# Patient Record
Sex: MALE | Race: WHITE | NOT HISPANIC OR LATINO
[De-identification: names, ages, dates, MRNs, and addresses within clinical notes are randomized per-mention and may not be internally consistent; named-entity substitution may affect disease eponyms.]

---

## 2018-09-20 ENCOUNTER — APPOINTMENT (OUTPATIENT)
Dept: PODIATRY | Facility: CLINIC | Age: 68
End: 2018-09-20
Payer: MEDICARE

## 2018-09-20 ENCOUNTER — OUTPATIENT (OUTPATIENT)
Dept: OUTPATIENT SERVICES | Facility: HOSPITAL | Age: 68
LOS: 1 days | Discharge: HOME | End: 2018-09-20

## 2018-09-20 DIAGNOSIS — M14.671 CHARCOT'S JOINT, RIGHT ANKLE AND FOOT: ICD-10-CM

## 2018-09-20 DIAGNOSIS — E11.40 TYPE 2 DIABETES MELLITUS WITH DIABETIC NEUROPATHY, UNSPECIFIED: ICD-10-CM

## 2018-09-20 PROCEDURE — 97760 ORTHOTIC MGMT&TRAING 1ST ENC: CPT

## 2018-10-18 ENCOUNTER — APPOINTMENT (OUTPATIENT)
Dept: PODIATRY | Facility: CLINIC | Age: 68
End: 2018-10-18

## 2020-03-26 ENCOUNTER — INPATIENT (INPATIENT)
Facility: HOSPITAL | Age: 70
LOS: 4 days | End: 2020-03-31
Attending: FAMILY MEDICINE | Admitting: FAMILY MEDICINE
Payer: MEDICARE

## 2020-03-26 VITALS
OXYGEN SATURATION: 100 % | SYSTOLIC BLOOD PRESSURE: 122 MMHG | DIASTOLIC BLOOD PRESSURE: 74 MMHG | HEIGHT: 73 IN | RESPIRATION RATE: 20 BRPM | WEIGHT: 315 LBS | TEMPERATURE: 96 F | HEART RATE: 109 BPM

## 2020-03-26 DIAGNOSIS — I48.91 UNSPECIFIED ATRIAL FIBRILLATION: ICD-10-CM

## 2020-03-26 DIAGNOSIS — E11.9 TYPE 2 DIABETES MELLITUS WITHOUT COMPLICATIONS: ICD-10-CM

## 2020-03-26 DIAGNOSIS — Z90.89 ACQUIRED ABSENCE OF OTHER ORGANS: Chronic | ICD-10-CM

## 2020-03-26 DIAGNOSIS — M14.679 CHARCOT'S JOINT, UNSPECIFIED ANKLE AND FOOT: Chronic | ICD-10-CM

## 2020-03-26 DIAGNOSIS — N17.9 ACUTE KIDNEY FAILURE, UNSPECIFIED: ICD-10-CM

## 2020-03-26 DIAGNOSIS — E78.00 PURE HYPERCHOLESTEROLEMIA, UNSPECIFIED: ICD-10-CM

## 2020-03-26 DIAGNOSIS — R06.02 SHORTNESS OF BREATH: ICD-10-CM

## 2020-03-26 LAB
ALBUMIN SERPL ELPH-MCNC: 3.1 G/DL — LOW (ref 3.5–5.2)
ALP SERPL-CCNC: 108 U/L — SIGNIFICANT CHANGE UP (ref 30–115)
ALT FLD-CCNC: 10 U/L — SIGNIFICANT CHANGE UP (ref 0–41)
ANION GAP SERPL CALC-SCNC: 17 MMOL/L — HIGH (ref 7–14)
AST SERPL-CCNC: 18 U/L — SIGNIFICANT CHANGE UP (ref 0–41)
BASE EXCESS BLDV CALC-SCNC: -6.3 MMOL/L — LOW (ref -2–2)
BILIRUB SERPL-MCNC: 0.4 MG/DL — SIGNIFICANT CHANGE UP (ref 0.2–1.2)
BUN SERPL-MCNC: 56 MG/DL — HIGH (ref 10–20)
CA-I SERPL-SCNC: 1.17 MMOL/L — SIGNIFICANT CHANGE UP (ref 1.12–1.3)
CALCIUM SERPL-MCNC: 8.2 MG/DL — LOW (ref 8.5–10.1)
CHLORIDE SERPL-SCNC: 99 MMOL/L — SIGNIFICANT CHANGE UP (ref 98–110)
CK MB CFR SERPL CALC: 6.1 NG/ML — SIGNIFICANT CHANGE UP (ref 0.6–6.3)
CK MB CFR SERPL CALC: 6.7 NG/ML — HIGH (ref 0.6–6.3)
CK SERPL-CCNC: 220 U/L — SIGNIFICANT CHANGE UP (ref 0–225)
CK SERPL-CCNC: 263 U/L — HIGH (ref 0–225)
CO2 SERPL-SCNC: 20 MMOL/L — SIGNIFICANT CHANGE UP (ref 17–32)
CREAT ?TM UR-MCNC: 83 MG/DL — SIGNIFICANT CHANGE UP
CREAT SERPL-MCNC: 3.5 MG/DL — HIGH (ref 0.7–1.5)
GAS PNL BLDV: 136 MMOL/L — SIGNIFICANT CHANGE UP (ref 136–145)
GAS PNL BLDV: SIGNIFICANT CHANGE UP
GLUCOSE BLDC GLUCOMTR-MCNC: 177 MG/DL — HIGH (ref 70–99)
GLUCOSE BLDC GLUCOMTR-MCNC: 178 MG/DL — HIGH (ref 70–99)
GLUCOSE BLDC GLUCOMTR-MCNC: 184 MG/DL — HIGH (ref 70–99)
GLUCOSE SERPL-MCNC: 202 MG/DL — HIGH (ref 70–99)
HCO3 BLDV-SCNC: 23 MMOL/L — SIGNIFICANT CHANGE UP (ref 22–29)
HCT VFR BLD CALC: 37.2 % — LOW (ref 42–52)
HCT VFR BLDA CALC: 43.9 % — SIGNIFICANT CHANGE UP (ref 34–44)
HCV AB S/CO SERPL IA: 0.04 COI — SIGNIFICANT CHANGE UP
HCV AB SERPL-IMP: SIGNIFICANT CHANGE UP
HGB BLD CALC-MCNC: 14.3 G/DL — SIGNIFICANT CHANGE UP (ref 14–18)
HGB BLD-MCNC: 12.1 G/DL — LOW (ref 14–18)
HOROWITZ INDEX BLDV+IHG-RTO: 21 — SIGNIFICANT CHANGE UP
LACTATE BLDV-MCNC: 2.3 MMOL/L — HIGH (ref 0.5–1.6)
MCHC RBC-ENTMCNC: 29.4 PG — SIGNIFICANT CHANGE UP (ref 27–31)
MCHC RBC-ENTMCNC: 32.5 G/DL — SIGNIFICANT CHANGE UP (ref 32–37)
MCV RBC AUTO: 90.3 FL — SIGNIFICANT CHANGE UP (ref 80–94)
NRBC # BLD: 0 /100 WBCS — SIGNIFICANT CHANGE UP (ref 0–0)
NT-PROBNP SERPL-SCNC: 2618 PG/ML — HIGH (ref 0–300)
OSMOLALITY UR: 401 MOS/KG — SIGNIFICANT CHANGE UP (ref 50–1400)
PCO2 BLDV: 58 MMHG — HIGH (ref 41–51)
PH BLDV: 7.2 — LOW (ref 7.26–7.43)
PLATELET # BLD AUTO: 386 K/UL — SIGNIFICANT CHANGE UP (ref 130–400)
PO2 BLDV: 26 MMHG — SIGNIFICANT CHANGE UP (ref 20–40)
POTASSIUM BLDV-SCNC: 4.2 MMOL/L — SIGNIFICANT CHANGE UP (ref 3.3–5.6)
POTASSIUM SERPL-MCNC: 4.8 MMOL/L — SIGNIFICANT CHANGE UP (ref 3.5–5)
POTASSIUM SERPL-SCNC: 4.8 MMOL/L — SIGNIFICANT CHANGE UP (ref 3.5–5)
PROT SERPL-MCNC: 6.6 G/DL — SIGNIFICANT CHANGE UP (ref 6–8)
RBC # BLD: 4.12 M/UL — LOW (ref 4.7–6.1)
RBC # FLD: 13.1 % — SIGNIFICANT CHANGE UP (ref 11.5–14.5)
SAO2 % BLDV: 38 % — SIGNIFICANT CHANGE UP
SODIUM SERPL-SCNC: 136 MMOL/L — SIGNIFICANT CHANGE UP (ref 135–146)
SODIUM UR-SCNC: 54 MMOL/L — SIGNIFICANT CHANGE UP
TROPONIN T SERPL-MCNC: 0.04 NG/ML — CRITICAL HIGH
TROPONIN T SERPL-MCNC: 0.04 NG/ML — CRITICAL HIGH
TROPONIN T SERPL-MCNC: 0.05 NG/ML — CRITICAL HIGH
WBC # BLD: 8.97 K/UL — SIGNIFICANT CHANGE UP (ref 4.8–10.8)
WBC # FLD AUTO: 8.97 K/UL — SIGNIFICANT CHANGE UP (ref 4.8–10.8)

## 2020-03-26 PROCEDURE — 99223 1ST HOSP IP/OBS HIGH 75: CPT | Mod: AI

## 2020-03-26 PROCEDURE — 71045 X-RAY EXAM CHEST 1 VIEW: CPT | Mod: 26

## 2020-03-26 PROCEDURE — 76770 US EXAM ABDO BACK WALL COMP: CPT | Mod: 26

## 2020-03-26 PROCEDURE — 99285 EMERGENCY DEPT VISIT HI MDM: CPT | Mod: GC

## 2020-03-26 RX ORDER — HEPARIN SODIUM 5000 [USP'U]/ML
5000 INJECTION INTRAVENOUS; SUBCUTANEOUS EVERY 12 HOURS
Refills: 0 | Status: DISCONTINUED | OUTPATIENT
Start: 2020-03-26 | End: 2020-03-28

## 2020-03-26 RX ORDER — FUROSEMIDE 40 MG
60 TABLET ORAL ONCE
Refills: 0 | Status: DISCONTINUED | OUTPATIENT
Start: 2020-03-26 | End: 2020-03-26

## 2020-03-26 RX ORDER — SIMVASTATIN 20 MG/1
1 TABLET, FILM COATED ORAL
Qty: 0 | Refills: 0 | DISCHARGE

## 2020-03-26 RX ORDER — FUROSEMIDE 40 MG
40 TABLET ORAL ONCE
Refills: 0 | Status: COMPLETED | OUTPATIENT
Start: 2020-03-26 | End: 2020-03-26

## 2020-03-26 RX ORDER — FUROSEMIDE 40 MG
40 TABLET ORAL
Refills: 0 | Status: DISCONTINUED | OUTPATIENT
Start: 2020-03-26 | End: 2020-03-28

## 2020-03-26 RX ORDER — METOPROLOL TARTRATE 50 MG
25 TABLET ORAL ONCE
Refills: 0 | Status: COMPLETED | OUTPATIENT
Start: 2020-03-26 | End: 2020-03-26

## 2020-03-26 RX ORDER — DEXTROSE 50 % IN WATER 50 %
12.5 SYRINGE (ML) INTRAVENOUS ONCE
Refills: 0 | Status: DISCONTINUED | OUTPATIENT
Start: 2020-03-26 | End: 2020-03-31

## 2020-03-26 RX ORDER — SODIUM CHLORIDE 9 MG/ML
1000 INJECTION, SOLUTION INTRAVENOUS
Refills: 0 | Status: DISCONTINUED | OUTPATIENT
Start: 2020-03-26 | End: 2020-03-31

## 2020-03-26 RX ORDER — ASPIRIN/CALCIUM CARB/MAGNESIUM 324 MG
1 TABLET ORAL
Qty: 0 | Refills: 0 | DISCHARGE

## 2020-03-26 RX ORDER — FUROSEMIDE 40 MG
1 TABLET ORAL
Qty: 0 | Refills: 0 | DISCHARGE

## 2020-03-26 RX ORDER — CHLORHEXIDINE GLUCONATE 213 G/1000ML
1 SOLUTION TOPICAL
Refills: 0 | Status: DISCONTINUED | OUTPATIENT
Start: 2020-03-26 | End: 2020-03-31

## 2020-03-26 RX ORDER — DEXTROSE 50 % IN WATER 50 %
25 SYRINGE (ML) INTRAVENOUS ONCE
Refills: 0 | Status: DISCONTINUED | OUTPATIENT
Start: 2020-03-26 | End: 2020-03-31

## 2020-03-26 RX ORDER — DEXTROSE 50 % IN WATER 50 %
15 SYRINGE (ML) INTRAVENOUS ONCE
Refills: 0 | Status: DISCONTINUED | OUTPATIENT
Start: 2020-03-26 | End: 2020-03-31

## 2020-03-26 RX ORDER — GLUCAGON INJECTION, SOLUTION 0.5 MG/.1ML
1 INJECTION, SOLUTION SUBCUTANEOUS ONCE
Refills: 0 | Status: DISCONTINUED | OUTPATIENT
Start: 2020-03-26 | End: 2020-03-31

## 2020-03-26 RX ORDER — SPIRONOLACTONE 25 MG/1
0 TABLET, FILM COATED ORAL
Qty: 0 | Refills: 0 | DISCHARGE

## 2020-03-26 RX ORDER — INSULIN GLARGINE 100 [IU]/ML
36 INJECTION, SOLUTION SUBCUTANEOUS AT BEDTIME
Refills: 0 | Status: DISCONTINUED | OUTPATIENT
Start: 2020-03-26 | End: 2020-03-27

## 2020-03-26 RX ORDER — SIMVASTATIN 20 MG/1
20 TABLET, FILM COATED ORAL AT BEDTIME
Refills: 0 | Status: DISCONTINUED | OUTPATIENT
Start: 2020-03-26 | End: 2020-03-31

## 2020-03-26 RX ORDER — INSULIN GLARGINE 100 [IU]/ML
36 INJECTION, SOLUTION SUBCUTANEOUS
Qty: 0 | Refills: 0 | DISCHARGE

## 2020-03-26 RX ORDER — CANAGLIFLOZIN 100 MG/1
1 TABLET, FILM COATED ORAL
Qty: 0 | Refills: 0 | DISCHARGE

## 2020-03-26 RX ORDER — INSULIN LISPRO 100/ML
VIAL (ML) SUBCUTANEOUS
Refills: 0 | Status: DISCONTINUED | OUTPATIENT
Start: 2020-03-26 | End: 2020-03-31

## 2020-03-26 RX ORDER — METOPROLOL TARTRATE 50 MG
25 TABLET ORAL DAILY
Refills: 0 | Status: DISCONTINUED | OUTPATIENT
Start: 2020-03-26 | End: 2020-03-27

## 2020-03-26 RX ADMIN — Medication 40 MILLIGRAM(S): at 09:32

## 2020-03-26 RX ADMIN — Medication 40 MILLIGRAM(S): at 18:43

## 2020-03-26 RX ADMIN — SIMVASTATIN 20 MILLIGRAM(S): 20 TABLET, FILM COATED ORAL at 21:55

## 2020-03-26 RX ADMIN — HEPARIN SODIUM 5000 UNIT(S): 5000 INJECTION INTRAVENOUS; SUBCUTANEOUS at 18:42

## 2020-03-26 RX ADMIN — Medication 1: at 16:48

## 2020-03-26 RX ADMIN — Medication 1: at 12:31

## 2020-03-26 NOTE — ED PROVIDER NOTE - ATTENDING CONTRIBUTION TO CARE
69y male h/o HTN, DM with charcot joint c/o progressively worsening SOB with orthopnea and worsening leg/hand swelling with decreased u/o despite beginning diuretic, no cough, no CP, no f/c, no v/d, no palpitations, largely housebound with debilitated wife, home visit NP mentioned afib but pt does not carry that diagnosis, on exam vital signs appreciated, obese, dyspneic on minimal exertion but comfortable at rest on O2, conj pink op clear neck supple cor irreg lungs with diminished bs at bases L>R, no wheeze, no rhonchi, abd obese, snt, 3= b/l LE edema with chronic venous stasis changes/lichenification with areas of weeping, +charcot feet with left plantar callus without evidence of infection, calves nontender neuro nonfocal, will check labs, ekg, imaging

## 2020-03-26 NOTE — H&P ADULT - HISTORY OF PRESENT ILLNESS
· HPI Objective Statement: 69y M w/ PMH of HTN, DM, and Charcot join presents with progressive SOB for 1 week. Went to primary MD at symptom onset. Was prescribed Aldactone at first and was switched to lasix, both of which did not relieve his symptoms. + increasing peripheral edema and dry cough. Denies fever, chills, headache, lightheadedness, CP, abd pain, n/v/d, or numbness/tingling. · HPI Objective Statement: 69y M w/ PMH of DM Type 2, and Charcot joint: right foot: presents with progressive SOB for 1 week. Went to primary MD: Mike Villar at symptom onset. Was prescribed Aldactone 25mg at first which did not relieve dyspnea so lasix 40 mg was added. Both of which did not relieve his symptoms. Now presents to ER fo evaluation. -Denies chest pain -Reports increasing Pedal Edema with weeping legs. -Self ambulatory at home, uses a cane outside, ** Unable to ambulate presently.  -Has a home Nurse Practitioner who told patient he has Atrial Fibrillation: EKG was done about 6 days ago. Denied any occurrence of Atrial Fibrillation prior to this event. He was also informed he has Acute Renal Insufficiency. Denies prior Renal problems. -He was set up to then see Nephrologist Dr Grant this coming Monday.

## 2020-03-26 NOTE — H&P ADULT - NSICDXPASTMEDICALHX_GEN_ALL_CORE_FT
02/25/2017        [facility]  [address] [facility]      Dear Maria Dolores Duran records indicate that you have outstanding lab work and or testing that was ordered for you and has not yet been completed:      Vitamin D, 25-Hydroxy [E]  To provide y PAST MEDICAL HISTORY:  Diabetes Type 2    High cholesterol     Venous insufficiency

## 2020-03-26 NOTE — ED ADULT NURSE NOTE - NSFALLRSKASSESSDT_ED_ALL_ED
Pt denies sleep apnea.   \"I had a nuclear stress test at [facility] around 2012\" 26-Mar-2020 07:50

## 2020-03-26 NOTE — ED PROVIDER NOTE - CLINICAL SUMMARY MEDICAL DECISION MAKING FREE TEXT BOX
Patient with progressively worsening leg swelling now with dyspnea/orthopnea, PE as above, labs and studies reviewed, will admit monitored setting

## 2020-03-26 NOTE — H&P ADULT - NSHPSOCIALHISTORY_GEN_ALL_CORE
Tobacco use:   EtOH use:  Illicit drug use:  Marital Status: Tobacco use: Smoke 50 yrs: cigarettes, stopped 8 yrs ago, occasional Cigar  EtOH use: Rarely  Illicit drug use: No  Marital Status:

## 2020-03-26 NOTE — CONSULT NOTE ADULT - ASSESSMENT
Patient with volume overload secondary cata. He has afib possibly secondary to volume overload. He needs a echo. Repeat labs in am. Check u/a renal ultrasound . Continue ace when renal agrees. Metoprolol  er 12.5 q 24. When stable if candidate xarelto 15 qd with food. Possible out patient cardioversion in 6 weeks

## 2020-03-26 NOTE — ED PROVIDER NOTE - CARE PLAN
Principal Discharge DX:	Shortness of breath  Secondary Diagnosis:	Other hypervolemia  Secondary Diagnosis:	Acute renal failure, unspecified acute renal failure type  Secondary Diagnosis:	Atrial fibrillation, new onset

## 2020-03-26 NOTE — H&P ADULT - NSHPREVIEWOFSYSTEMS_GEN_ALL_CORE
REVIEW OF SYSTEMS:    CONSTITUTIONAL: No weakness, fevers or chills  EYES/ENT: No visual changes;  No vertigo or throat pain   NECK: No pain or stiffness  RESPIRATORY: No cough, wheezing, hemoptysis; POSITIVE shortness of breath  CARDIOVASCULAR: No chest pain or palpitations, ++ leg edema  GASTROINTESTINAL: No abdominal or epigastric pain. No nausea, vomiting, or hematemesis; No diarrhea or constipation. No melena or hematochezia.  GENITOURINARY: No dysuria, frequency or hematuria  NEUROLOGICAL: No numbness or weakness  SKIN: POSITIVE blistering

## 2020-03-26 NOTE — H&P ADULT - ASSESSMENT
70 y/o male admitted with Dyspnea, Atrial Fibrillation: onset about 6 days ago with ADAL: denies any prior Renal issues.

## 2020-03-26 NOTE — ED ADULT NURSE NOTE - NSIMPLEMENTINTERV_GEN_ALL_ED
Implemented All Fall with Harm Risk Interventions:  [place] to call system. Call bell, personal items and telephone within reach. Instruct patient to call for assistance. Room bathroom lighting operational. Non-slip footwear when patient is off stretcher. Physically safe environment: no spills, clutter or unnecessary equipment. Stretcher in lowest position, wheels locked, appropriate side rails in place. Provide visual cue, wrist band, yellow gown, etc. Monitor gait and stability. Monitor for mental status changes and reorient to person, place, and time. Review medications for side effects contributing to fall risk. Reinforce activity limits and safety measures with patient and family. Provide visual clues: red socks.

## 2020-03-26 NOTE — ED PROVIDER NOTE - PROGRESS NOTE DETAILS
labs and studies reviewed and d/w Dr Mcclendon, will admit LR tele, rayo placed with 300ml u/o, sono ordered, renal aware

## 2020-03-26 NOTE — CONSULT NOTE ADULT - SUBJECTIVE AND OBJECTIVE BOX
CARDIOLOGY CONSULT NOTE     CHIEF COMPLAINT/REASON FOR CONSULT:    HPI:  · HPI Objective Statement: 69y M w/ PMH of DM Type 2, and Charcot joint: right foot: presents with progressive SOB for 1 week. Went to primary MD: Mike Villar at symptom onset. Was prescribed Aldactone 25mg at first which did not relieve dyspnea so lasix 40 mg was added. Both of which did not relieve his symptoms. Now presents to ER fo evaluation. -Denies chest pain -Reports increasing Pedal Edema with weeping legs. -Self ambulatory at home, uses a cane outside, ** Unable to ambulate presently.  -Has a home Nurse Practitioner who told patient he has Atrial Fibrillation: EKG was done about 6 days ago. Denied any occurrence of Atrial Fibrillation prior to this event. He was also informed he has Acute Renal Insufficiency. Denies prior Renal problems. -He was set up to then see Nephrologist Dr Grant this coming Monday. (26 Mar 2020 09:50)      PAST MEDICAL & SURGICAL HISTORY:  Venous insufficiency  Diabetes: Type 2  High cholesterol  Charcot ankle: Right foot  S/P tonsillectomy      Cardiac Risks:   [ ]HTN, [x ] DM, [ ] Smoking, [ ] FH,  [x ] Lipids        MEDICATIONS:  MEDICATIONS  (STANDING):  chlorhexidine 4% Liquid 1 Application(s) Topical <User Schedule>  dextrose 5%. 1000 milliLiter(s) (50 mL/Hr) IV Continuous <Continuous>  dextrose 50% Injectable 12.5 Gram(s) IV Push once  dextrose 50% Injectable 25 Gram(s) IV Push once  dextrose 50% Injectable 25 Gram(s) IV Push once  furosemide   Injectable 40 milliGRAM(s) IV Push two times a day  heparin  Injectable 5000 Unit(s) SubCutaneous every 12 hours  insulin glargine SubCutaneous Injection (LANTUS) - Peds 36 Unit(s) SubCutaneous at bedtime  insulin lispro (HumaLOG) corrective regimen sliding scale   SubCutaneous three times a day before meals  simvastatin 20 milliGRAM(s) Oral at bedtime      FAMILY HISTORY:  Family history of breast cancer in mother      SOCIAL HISTORY:      [ ] Marital status   Allergies    No Known Allergies    Intolerances    	    REVIEW OF SYSTEMS:  CONSTITUTIONAL: No fever, weight loss, or fatigue  EYES: No eye pain, visual disturbances, or discharge  ENMT:  No difficulty hearing, tinnitus, vertigo; No sinus or throat pain  NECK: No pain or stiffness  RESPIRATORY: No cough, wheezing, chills or hemoptysis; No Shortness of Breath  CARDIOVASCULAR: See above  GASTROINTESTINAL: No abdominal or epigastric pain. No nausea, vomiting, or hematemesis; No diarrhea or constipation. No melena or hematochezia.  GENITOURINARY: No dysuria, frequency, hematuria, or incontinence  NEUROLOGICAL: No headaches, memory loss, loss of strength, numbness, or tremors  SKIN: No itching, burning, rashes, or lesions   	    PHYSICAL EXAM:  T(C): 34.4 (03-26-20 @ 11:19), Max: 35.4 (03-26-20 @ 07:41)  HR: 91 (03-26-20 @ 11:19) (91 - 109)  BP: 130/59 (03-26-20 @ 11:19) (117/54 - 130/59)  RR: 20 (03-26-20 @ 09:35) (20 - 20)  SpO2: 100% (03-26-20 @ 09:35) (100% - 100%)  Wt(kg): --  I&O's Summary      Appearance: Normal	  Psychiatry: A & O x 3, Mood & affect appropriate  HEENT:   Normal oral mucosa, PERRL, EOMI	  Lymphatic: No lymphadenopathy  Cardiovascular: Normal S1 S2 irreg, No JVD, No murmurs  Respiratory: Lungs clear to auscultation	  Gastrointestinal:  Soft, Non-tender, + BS	  Skin: No rashes, No ecchymoses, No cyanosis	  Neurologic: Non-focal  Extremities: Normal range of motion, No clubbing, cyanosis 1+ leg edema  Vascular: Peripheral pulses palpable 2+ bilaterally      ECG:  	not available    	  LABS:	 	    CARDIAC MARKERS:          Serum Pro-Brain Natriuretic Peptide: 2618 pg/mL (03-26 @ 07:50)                            12.1   8.97  )-----------( 386      ( 26 Mar 2020 07:50 )             37.2     03-26    136  |  99  |  56<H>  ----------------------------<  202<H>  4.8   |  20  |  3.5<H>    Ca    8.2<L>      26 Mar 2020 07:50    TPro  6.6  /  Alb  3.1<L>  /  TBili  0.4  /  DBili  x   /  AST  18  /  ALT  10  /  AlkPhos  108  03-26      proBNP: Serum Pro-Brain Natriuretic Peptide: 2618 pg/mL (03-26 @ 07:50)

## 2020-03-26 NOTE — H&P ADULT - ATTENDING COMMENTS
68 yo male with history of DM 2, HLD, charcot's foot, home bound admitted for     1. Acute respiratory distress, elevated troponin and new atrial fibrillation r/o CHF and AMI  - Telemetry monitoring  - Cardiology consulted, discussed case with him and he recommends echo and IV Lasix 40 BID. recommendations carried out  - Cycle troponin  - Daily weight, Pulse ox, Oxygen supplement  -Statin, ASA    2. ADAL   - Santillan placed  - renal US and urine lytes  - Nephrologist consulted  - Monitor urine output  - Monitor BUN/Creatinine    3. DM2  - lantus and SSI  - Diabetic protocol    4. HLD  - Statin    DVT prophylaxis    Code status: Discussed with patient, he has no living will and wishes to be full code.   Goals of care discussion once echo result available for CHF classification and after nephrologist recommendations are noted.    Patient wants his wife called only in emergency situation 68 yo male with history of DM 2, HLD, charcot's foot, home bound admitted for     1. Acute respiratory distress, elevated troponin and new atrial fibrillation r/o CHF and AMI  - Telemetry monitoring  - Cardiology consulted, discussed case with him and he recommends echo and IV Lasix 40 BID. recommendations carried out  - Cycle troponin  - Daily weight, Pulse ox, Oxygen supplement  -Statin, ASA    2. ADAL   - Santillan placed  - renal US and urine lytes  - Nephrologist consulted  - Monitor urine output  - Monitor BUN/Creatinine    3. DM2  - lantus and SSI  - Diabetic protocol    4. Hypercholesterolemia  - Statin    DVT prophylaxis    Code status: Discussed with patient, he has no living will and wishes to be full code.   Goals of care discussion once echo result available for CHF classification and after nephrologist recommendations are noted.    Patient wants his wife called only in emergency situation

## 2020-03-26 NOTE — CHART NOTE - NSCHARTNOTEFT_GEN_A_CORE
Pt w/ BP: 108/57 and HR 90s-110 earlier - instructed RN to hold metoprolol.       Will have RN recheck BP now as HR now jumping to 105-115 to revaluate for metoprolol.

## 2020-03-26 NOTE — ED ADULT NURSE NOTE - PMH
Diabetes    High cholesterol    HTN (hypertension)    Water on the lung Diabetes    High cholesterol    Water on the lung

## 2020-03-26 NOTE — ED PROVIDER NOTE - SECONDARY DIAGNOSIS.
Other hypervolemia Acute renal failure, unspecified acute renal failure type Atrial fibrillation, new onset

## 2020-03-26 NOTE — H&P ADULT - NSHPPHYSICALEXAM_GEN_ALL_CORE
Vital Signs Last 24 Hrs    T(F): 95.7 (03-26-20 @ 07:41), Max: 95.7 (03-26-20 @ 07:41)  HR: 100 (03-26-20 @ 09:35) (100 - 109)  BP: 117/54 (03-26-20 @ 09:35)  RR: 20 (03-26-20 @ 09:35) (20 - 20)  SpO2: 100% (03-26-20 @ 09:35) (100% - 100%)    PHYSICAL EXAM:      Constitutional: NAD, A&O x3    Eyes: PERRLA    Respiratory: +air entry, no rales, no rhonchi, no wheezes    Cardiovascular: +S1 and S2, regular rate and rhythm    Gastrointestinal: +BS, soft, non-tender, not distended    Extremities:  no edema, no calf tenderness    Vascular: +dorsal pedis and radial pulses, no extremity cyanosis    Neurological: sensation intact, ROM equal B/L, CN II-XII intact    Skin: no rashes, normal turgor Vital Signs Last 24 Hrs    T(F): 95.7 (03-26-20 @ 07:41), Max: 95.7 (03-26-20 @ 07:41)  HR: 100 (03-26-20 @ 09:35) (100 - 109)  BP: 117/54 (03-26-20 @ 09:35)  RR: 20 (03-26-20 @ 09:35) (20 - 20)  SpO2: 100% (03-26-20 @ 09:35) (100% - 100%)    PHYSICAL EXAM:      Constitutional: NAD, A&O x3    Eyes: PERRLA    Respiratory: +air entry, ** Decreased BS at bases bilaterally, no rales, no rhonchi, no wheezes    Cardiovascular: +S1 and S2, **irregular rate and rhythm, no audible murmur    Gastrointestinal: +BS, soft, non-tender, not distended    Extremities:  ** Marked Pedal edema, no calf tenderness    Vascular: **Brawny induration lower legs    Neurological: sensation intact, ROM equal B/L, CN II-XII intact    Skin: ++ No weeping noted, raw pink skin lower legs.

## 2020-03-26 NOTE — H&P ADULT - NSHPLABSRESULTS_GEN_ALL_CORE
12.1   8.97  )-----------( 386      ( 26 Mar 2020 07:50 )             37.2       03-26    136  |  99  |  56<H>  ----------------------------<  202<H>  4.8   |  20  |  3.5<H>    Ca    8.2<L>      26 Mar 2020 07:50    TPro  6.6  /  Alb  3.1<L>  /  TBili  0.4  /  DBili  x   /  AST  18  /  ALT  10  /  AlkPhos  108  03-26        CARDIAC MARKERS ( 26 Mar 2020 07:50 )  x     / 0.05 ng/mL        Xray Chest 1 View- PORTABLE-Urgent (03.26.20 @ 08:44) >      Impression:      Blunting of bilateral costophrenic angles. No focal consolidation.

## 2020-03-26 NOTE — ED PROVIDER NOTE - PHYSICAL EXAMINATION
CONSTITUTIONAL: Well-developed; well-nourished; in no acute distress.   SKIN: warm, dry  HEAD: Normocephalic; atraumatic.  EYES: PERRL, EOMI, no conjunctival erythema  ENT: No nasal discharge; airway clear.  NECK: Supple; non tender.  CARD: S1, S2 normal; no murmurs, gallops, or rubs. Tachycardic.  RESP: Increased work of breathing. Decreased breath sounds at B/L bases.  ABD: soft ntnd  EXT: Normal ROM.  No clubbing, cyanosis. B/L peripheral edema w/ chronic changes. + open sores at B/L anterior legs.    LYMPH: No acute cervical adenopathy.  NEURO: Alert, oriented, grossly unremarkable  PSYCH: Cooperative, appropriate.

## 2020-03-26 NOTE — ED PROVIDER NOTE - NS ED ROS FT
Eyes:  No visual changes, eye pain or discharge.  ENMT:  No hearing changes, pain, no sore throat or runny nose, no difficulty swallowing  Cardiac:  No chest pain, No chest pain with exertion. + SOB, edema  Respiratory:  No respiratory distress. No hemoptysis. No history of asthma or RAD. + cough  GI:  No nausea, vomiting, diarrhea or abdominal pain.  :  No dysuria, frequency or burning.  MS:  No myalgia, muscle weakness, joint pain or back pain.  Neuro:  No headache or weakness.  No LOC.  Skin:  No skin rash.   Endocrine: + history of diabetes.

## 2020-03-26 NOTE — ED PROVIDER NOTE - OBJECTIVE STATEMENT
69y M w/ PMH of HTN, DM, and Charcot join presents with progressive SOB for 1 week. Went to primary MD at symptom onset. Was prescribed -lactone at first and was switched to lasix, both of which did not relieve his symptoms. + increasing peripheral edema and dry cough. Denies fever, chills, headache, lightheadedness, CP, abd pain, n/v/d, or numbness/tingling.

## 2020-03-27 LAB
ANION GAP SERPL CALC-SCNC: 16 MMOL/L — HIGH (ref 7–14)
ANION GAP SERPL CALC-SCNC: 17 MMOL/L — HIGH (ref 7–14)
BUN SERPL-MCNC: 65 MG/DL — CRITICAL HIGH (ref 10–20)
BUN SERPL-MCNC: 67 MG/DL — CRITICAL HIGH (ref 10–20)
C DIFF BY PCR RESULT: NEGATIVE — SIGNIFICANT CHANGE UP
C DIFF TOX GENS STL QL NAA+PROBE: SIGNIFICANT CHANGE UP
CALCIUM SERPL-MCNC: 8 MG/DL — LOW (ref 8.5–10.1)
CALCIUM SERPL-MCNC: 8.1 MG/DL — LOW (ref 8.5–10.1)
CHLORIDE SERPL-SCNC: 103 MMOL/L — SIGNIFICANT CHANGE UP (ref 98–110)
CHLORIDE SERPL-SCNC: 103 MMOL/L — SIGNIFICANT CHANGE UP (ref 98–110)
CO2 SERPL-SCNC: 17 MMOL/L — SIGNIFICANT CHANGE UP (ref 17–32)
CO2 SERPL-SCNC: 20 MMOL/L — SIGNIFICANT CHANGE UP (ref 17–32)
CREAT SERPL-MCNC: 3.2 MG/DL — HIGH (ref 0.7–1.5)
CREAT SERPL-MCNC: 3.3 MG/DL — HIGH (ref 0.7–1.5)
ESTIMATED AVERAGE GLUCOSE: 186 MG/DL — HIGH (ref 68–114)
GLUCOSE BLDC GLUCOMTR-MCNC: 152 MG/DL — HIGH (ref 70–99)
GLUCOSE BLDC GLUCOMTR-MCNC: 168 MG/DL — HIGH (ref 70–99)
GLUCOSE BLDC GLUCOMTR-MCNC: 181 MG/DL — HIGH (ref 70–99)
GLUCOSE BLDC GLUCOMTR-MCNC: 181 MG/DL — HIGH (ref 70–99)
GLUCOSE SERPL-MCNC: 156 MG/DL — HIGH (ref 70–99)
GLUCOSE SERPL-MCNC: 161 MG/DL — HIGH (ref 70–99)
HBA1C BLD-MCNC: 8.1 % — HIGH (ref 4–5.6)
HCT VFR BLD CALC: 35 % — LOW (ref 42–52)
HGB BLD-MCNC: 11.1 G/DL — LOW (ref 14–18)
MCHC RBC-ENTMCNC: 28.9 PG — SIGNIFICANT CHANGE UP (ref 27–31)
MCHC RBC-ENTMCNC: 31.7 G/DL — LOW (ref 32–37)
MCV RBC AUTO: 91.1 FL — SIGNIFICANT CHANGE UP (ref 80–94)
NRBC # BLD: 0 /100 WBCS — SIGNIFICANT CHANGE UP (ref 0–0)
PLATELET # BLD AUTO: 319 K/UL — SIGNIFICANT CHANGE UP (ref 130–400)
POTASSIUM SERPL-MCNC: 4.6 MMOL/L — SIGNIFICANT CHANGE UP (ref 3.5–5)
POTASSIUM SERPL-MCNC: 4.6 MMOL/L — SIGNIFICANT CHANGE UP (ref 3.5–5)
POTASSIUM SERPL-SCNC: 4.6 MMOL/L — SIGNIFICANT CHANGE UP (ref 3.5–5)
POTASSIUM SERPL-SCNC: 4.6 MMOL/L — SIGNIFICANT CHANGE UP (ref 3.5–5)
RBC # BLD: 3.84 M/UL — LOW (ref 4.7–6.1)
RBC # FLD: 13.1 % — SIGNIFICANT CHANGE UP (ref 11.5–14.5)
SODIUM SERPL-SCNC: 137 MMOL/L — SIGNIFICANT CHANGE UP (ref 135–146)
SODIUM SERPL-SCNC: 139 MMOL/L — SIGNIFICANT CHANGE UP (ref 135–146)
TSH SERPL-MCNC: 0.84 UIU/ML — SIGNIFICANT CHANGE UP (ref 0.27–4.2)
WBC # BLD: 12.11 K/UL — HIGH (ref 4.8–10.8)
WBC # FLD AUTO: 12.11 K/UL — HIGH (ref 4.8–10.8)

## 2020-03-27 PROCEDURE — 99233 SBSQ HOSP IP/OBS HIGH 50: CPT

## 2020-03-27 RX ORDER — PANTOPRAZOLE SODIUM 20 MG/1
40 TABLET, DELAYED RELEASE ORAL
Refills: 0 | Status: DISCONTINUED | OUTPATIENT
Start: 2020-03-27 | End: 2020-03-31

## 2020-03-27 RX ORDER — METOPROLOL TARTRATE 50 MG
12.5 TABLET ORAL EVERY 6 HOURS
Refills: 0 | Status: DISCONTINUED | OUTPATIENT
Start: 2020-03-27 | End: 2020-03-28

## 2020-03-27 RX ADMIN — HEPARIN SODIUM 5000 UNIT(S): 5000 INJECTION INTRAVENOUS; SUBCUTANEOUS at 05:12

## 2020-03-27 RX ADMIN — Medication 12.5 MILLIGRAM(S): at 17:11

## 2020-03-27 RX ADMIN — CHLORHEXIDINE GLUCONATE 1 APPLICATION(S): 213 SOLUTION TOPICAL at 05:11

## 2020-03-27 RX ADMIN — PANTOPRAZOLE SODIUM 40 MILLIGRAM(S): 20 TABLET, DELAYED RELEASE ORAL at 13:07

## 2020-03-27 RX ADMIN — Medication 1: at 16:57

## 2020-03-27 RX ADMIN — HEPARIN SODIUM 5000 UNIT(S): 5000 INJECTION INTRAVENOUS; SUBCUTANEOUS at 17:11

## 2020-03-27 RX ADMIN — Medication 12.5 MILLIGRAM(S): at 15:30

## 2020-03-27 RX ADMIN — Medication 1: at 08:15

## 2020-03-27 RX ADMIN — Medication 40 MILLIGRAM(S): at 05:11

## 2020-03-27 RX ADMIN — Medication 25 MILLIGRAM(S): at 06:54

## 2020-03-27 RX ADMIN — Medication 12.5 MILLIGRAM(S): at 23:22

## 2020-03-27 RX ADMIN — Medication 40 MILLIGRAM(S): at 17:11

## 2020-03-27 RX ADMIN — Medication 1: at 11:48

## 2020-03-27 RX ADMIN — SIMVASTATIN 20 MILLIGRAM(S): 20 TABLET, FILM COATED ORAL at 22:45

## 2020-03-27 NOTE — CONSULT NOTE ADULT - SUBJECTIVE AND OBJECTIVE BOX
NEPHROLOGY CONSULTATION NOTE    69y M w/ PMH of DM Type 2, and Charcot joint: right foot: presents with progressive SOB for 1 week. Went to primary MD: Mike Villar at symptom onset. Was prescribed Aldactone 25mg at first which did not relieve dyspnea so lasix 40 mg was added. Both of which did not relieve his symptoms. Now presents to ER fo evaluation.    -Reports increasing Pedal Edema with weeping legs.    -Has a home Nurse Practitioner who told patient he has Atrial Fibrillation: EKG was done about 6 days ago. Denied any occurrence of Atrial Fibrillation prior to this event. He was also informed he has Acute Renal Insufficiency. Denies prior Renal problems.  -He was set up to then see Nephrologist Dr Grant this coming Monday.    PAST MEDICAL & SURGICAL HISTORY:  Venous insufficiency  Diabetes: Type 2  High cholesterol  Charcot ankle: Right foot  S/P tonsillectomy    Allergies:  No Known Allergies    Home Medications Reviewed  Hospital Medications:   MEDICATIONS  (STANDING):  chlorhexidine 4% Liquid 1 Application(s) Topical <User Schedule>  dextrose 5%. 1000 milliLiter(s) (50 mL/Hr) IV Continuous <Continuous>  dextrose 50% Injectable 12.5 Gram(s) IV Push once  dextrose 50% Injectable 25 Gram(s) IV Push once  dextrose 50% Injectable 25 Gram(s) IV Push once  furosemide   Injectable 40 milliGRAM(s) IV Push two times a day  heparin  Injectable 5000 Unit(s) SubCutaneous every 12 hours  insulin glargine SubCutaneous Injection (LANTUS) - Peds 36 Unit(s) SubCutaneous at bedtime  insulin lispro (HumaLOG) corrective regimen sliding scale   SubCutaneous three times a day before meals  metoprolol succinate ER 25 milliGRAM(s) Oral daily  pantoprazole    Tablet 40 milliGRAM(s) Oral before breakfast  simvastatin 20 milliGRAM(s) Oral at bedtime      SOCIAL HISTORY:  Denies ETOH,Smoking,   FAMILY HISTORY:  Family history of breast cancer in mother        REVIEW OF SYSTEMS:  CONSTITUTIONAL: No weakness, fevers or chills  RESPIRATORY: No cough, wheezing, hemoptysis; Mild shortness of breath  CARDIOVASCULAR: Has chest pressure.  GASTROINTESTINAL: No abdominal or epigastric pain. No nausea, vomiting  GENITOURINARY: No dysuria, frequency, foamy urine, urinary urgency, incontinence or hematuria  NEUROLOGICAL: No numbness or weakness  SKIN: No itching, burning, rashes, or lesions   VASCULAR: Has bilateral lower extremity edema.   All other review of systems is negative unless indicated above.    VITALS:  T(F): 97 (03-27-20 @ 05:00), Max: 97 (03-27-20 @ 05:00)  HR: 59 (03-27-20 @ 05:00)  BP: 119/56 (03-27-20 @ 05:00)  RR: 16 (03-27-20 @ 05:00)  SpO2: --    03-26 @ 07:01  -  03-27 @ 07:00  --------------------------------------------------------  IN: 0 mL / OUT: 1375 mL / NET: -1375 mL          03-26-20 @ 07:01  -  03-27-20 @ 07:00  --------------------------------------------------------  IN: 0 mL / OUT: 1175 mL / NET: -1175 mL      I&O's Detail    26 Mar 2020 07:01  -  27 Mar 2020 07:00  --------------------------------------------------------  IN:  Total IN: 0 mL    OUT:    Indwelling Catheter - Urethral: 1175 mL    Voided: 200 mL  Total OUT: 1375 mL    Total NET: -1375 mL        Creatine Kinase, Serum: 220 U/L (03-26-20 @ 21:33)  Creatine Kinase, Serum: 263 U/L (03-26-20 @ 15:21)      PHYSICAL EXAM:  Constitutional: NAD, obese  Respiratory: CTA  Cardiovascular: S1, S2, RRR  Gastrointestinal: BS+, soft, NT/ND  Extremities: 1+ peripheral edema  Neurological: A/O x 3  : No CVA tenderness. Has rayo.   Skin: No rashes  Vascular Access:    LABS:  03-26    136  |  99  |  56<H>  ----------------------------<  202<H>  4.8   |  20  |  3.5<H>    Ca    8.2<L>      26 Mar 2020 07:50    TPro  6.6  /  Alb  3.1<L>  /  TBili  0.4  /  DBili      /  AST  18  /  ALT  10  /  AlkPhos  108  03-26    Creatinine Trend: 3.5 <--                        12.1   8.97  )-----------( 386      ( 26 Mar 2020 07:50 )             37.2     Urine Studies:    Creatinine, Random Urine: 83 mg/dL (03-26 @ 14:18)  Sodium, Random Urine: 54.0 mmoL/L (03-26 @ 14:18)  Osmolality, Random Urine: 401 mos/kg (03-26 @ 14:18)            RADIOLOGY & ADDITIONAL STUDIES:      < from: US Retroperitoneal Complete (03.26.20 @ 12:02) >  uboptimal evaluation due to patient body habitus and clinical condition.    RIGHT KIDNEY: Normal in echogenicity, size measuring 13.0 x 6.0 x 7.0 cm in length. No evidence of hydronephrosis, calculus or solid mass. Vascular flow is demonstrated atthe hilum.    LEFT KIDNEY: Normal in echogenicity, size measuring 17.0 x 7.0 x 8.0 cm in length. No evidence of hydronephrosis, calculus or solid mass. Vascular flow is demonstrated at the hilum. There are multiple cysts noted within the kidney the largest measuring 13.0 x 12.0 x 12.0 cm.    URINARY BLADDER: Underdistended patient on folic catheter.    IMPRESSION:  Suboptimal evaluation of the kidneys due to patient body habitus and clinical condition.    1. No hydronephrosis or renal calculi in the visualized portions of either kidneys.    2. Left renal cysts.    < end of copied text >  < from: Xray Chest 1 View- PORTABLE-Urgent (03.26.20 @ 08:44) >  Blunting of bilateral costophrenic angles. No focal consolidation.    < end of copied text >

## 2020-03-27 NOTE — PHYSICAL THERAPY INITIAL EVALUATION ADULT - CRITERIA FOR SKILLED THERAPEUTIC INTERVENTIONS
rehab potential/predicted duration of therapy intervention/anticipated equipment needs at discharge/impairments found/functional limitations in following categories/therapy frequency

## 2020-03-27 NOTE — PROGRESS NOTE ADULT - ASSESSMENT
Patient is 70 yo male with history of DM 2, HLD, charcot's foot, home bound admitted for     1. Acute respiratory hypoxic respiratory failure  Possible acute CHF  IV lasix PRN  TTE pending  monitor I/O  keep negative output       2.  atrial fib  -Continue with BB for rate control  Anticoagulation when stable   -Cardiology to follow up     3. ADAL on CKD stage 4  -continue with rayo  -renal US shows no acute process  - Nephrologist consulted  - Monitor urine output  - Monitor BUN/Creatinine    4 DM2  - lantus and SSI  - Diabetic protocol    5. Hypercholesterolemia  - Statin    DVT prophylaxis heparin  Code full Code

## 2020-03-27 NOTE — CONSULT NOTE ADULT - ASSESSMENT
Pt with DM, obesity, CKD admitted with chest pressure and increased LE edema.  Admitted found to have Afib.    ADAL on CKD4 - unknown baseline creat  - nonoliguric, kidney sono no obstruction - preserved size kidneys  - likely due to DM and cardiorenal syndrome  - proteinuria ~2 gr/g creat  - obtain UA  - check phos, iPTH    AFib - Xarelto not recommended in advanced renal failure  LE edema - likely due jasvir CHF  - cardiac enzyems, r/o ACS  - check 2D Echo  cont Lasix 40 m IV q 12 hrs  stric t is and Os  Will follow    Will spencer

## 2020-03-27 NOTE — PROGRESS NOTE ADULT - SUBJECTIVE AND OBJECTIVE BOX
CC.  SOB/Atrial fib  HPI.  Patient reports SOB are improving.  afebrile.  Offers no other complaints                Constitutional: No fever, fatigue or weight loss.  Skin: No rash.  Eyes: No recent vision problems or eye pain.  ENT: No congestion, ear pain, or sore throat.  Endocrine: No thyroid problems.  Cardiovascular: No chest pain or palpation.  Respiratory: No cough, congestion, or wheezing.  Gastrointestinal: No abdominal pain, nausea, vomiting, or diarrhea.  Genitourinary: No dysuria.  Musculoskeletal: No joint swelling.  Neurologic: No headache.      Vital Signs Last 24 Hrs  T(C): 36.1 (03-27-20 @ 05:00), Max: 36.1 (03-27-20 @ 05:00)  T(F): 97 (03-27-20 @ 05:00), Max: 97 (03-27-20 @ 05:00)  HR: 59 (03-27-20 @ 05:00) (59 - 110)  BP: 119/56 (03-27-20 @ 05:00) (99/55 - 148/60)  BP(mean): --  RR: 16 (03-27-20 @ 05:00) (16 - 16)  SpO2: --        PHYSICAL EXAM-  GENERAL: NAD,    HEAD:  Atraumatic, Normocephalic  EYES: EOMI, PERRLA, conjunctiva and sclera clear  NECK: Supple, No JVD, Normal thyroid  NERVOUS SYSTEM:  Alert & Oriented X3, Moving all extremities  CHEST/LUNG: Min crackles with good air entry  HEART: Regular rate and rhythm; No murmurs, rubs, or gallops  ABDOMEN: Soft, Nontender, Nondistended; Bowel sounds present  EXTREMITIES:    No clubbing, cyanosis, or edema  SKIN: No rashes or lesions                                  12.1   8.97  )-----------( 386      ( 26 Mar 2020 07:50 )             37.2     03-26    136  |  99  |  56<H>  ----------------------------<  202<H>  4.8   |  20  |  3.5<H>    Ca    8.2<L>      26 Mar 2020 07:50    TPro  6.6  /  Alb  3.1<L>  /  TBili  0.4  /  DBili  x   /  AST  18  /  ALT  10  /  AlkPhos  108  03-26    CARDIAC MARKERS ( 26 Mar 2020 21:33 )  x     / 0.04 ng/mL / 220 U/L / x     / 6.1 ng/mL  CARDIAC MARKERS ( 26 Mar 2020 15:21 )  x     / 0.04 ng/mL / 263 U/L / x     / 6.7 ng/mL  CARDIAC MARKERS ( 26 Mar 2020 07:50 )  x     / 0.05 ng/mL / x     / x     / x                      MEDICATIONS  (STANDING):  chlorhexidine 4% Liquid 1 Application(s) Topical <User Schedule>  dextrose 5%. 1000 milliLiter(s) (50 mL/Hr) IV Continuous <Continuous>  dextrose 50% Injectable 12.5 Gram(s) IV Push once  dextrose 50% Injectable 25 Gram(s) IV Push once  dextrose 50% Injectable 25 Gram(s) IV Push once  furosemide   Injectable 40 milliGRAM(s) IV Push two times a day  heparin  Injectable 5000 Unit(s) SubCutaneous every 12 hours  insulin glargine SubCutaneous Injection (LANTUS) - Peds 36 Unit(s) SubCutaneous at bedtime  insulin lispro (HumaLOG) corrective regimen sliding scale   SubCutaneous three times a day before meals  metoprolol succinate ER 25 milliGRAM(s) Oral daily  pantoprazole    Tablet 40 milliGRAM(s) Oral before breakfast  simvastatin 20 milliGRAM(s) Oral at bedtime    MEDICATIONS  (PRN):  dextrose 40% Gel 15 Gram(s) Oral once PRN Blood Glucose LESS THAN 70 milliGRAM(s)/deciliter  glucagon  Injectable 1 milliGRAM(s) IntraMuscular once PRN Glucose LESS THAN 70 milligrams/deciliter          RADIOLOGY RESULTS:

## 2020-03-27 NOTE — PHYSICAL THERAPY INITIAL EVALUATION ADULT - GENERAL OBSERVATIONS, REHAB EVAL
Pt encountered semi-reclined in bed, NAD, agreeable to PT, ok to be seen by PT as confirmed by RN, on 4 L02/min via NC (sa02 99% checked by PT), +rayo +tele, PCA in room.

## 2020-03-28 LAB
ANION GAP SERPL CALC-SCNC: 16 MMOL/L — HIGH (ref 7–14)
BUN SERPL-MCNC: 71 MG/DL — CRITICAL HIGH (ref 10–20)
CALCIUM SERPL-MCNC: 8 MG/DL — LOW (ref 8.5–10.1)
CHLORIDE SERPL-SCNC: 102 MMOL/L — SIGNIFICANT CHANGE UP (ref 98–110)
CO2 SERPL-SCNC: 20 MMOL/L — SIGNIFICANT CHANGE UP (ref 17–32)
CREAT SERPL-MCNC: 3.8 MG/DL — HIGH (ref 0.7–1.5)
GLUCOSE BLDC GLUCOMTR-MCNC: 151 MG/DL — HIGH (ref 70–99)
GLUCOSE BLDC GLUCOMTR-MCNC: 154 MG/DL — HIGH (ref 70–99)
GLUCOSE BLDC GLUCOMTR-MCNC: 156 MG/DL — HIGH (ref 70–99)
GLUCOSE BLDC GLUCOMTR-MCNC: 170 MG/DL — HIGH (ref 70–99)
GLUCOSE SERPL-MCNC: 166 MG/DL — HIGH (ref 70–99)
HCT VFR BLD CALC: 34.2 % — LOW (ref 42–52)
HGB BLD-MCNC: 10.7 G/DL — LOW (ref 14–18)
MCHC RBC-ENTMCNC: 28.9 PG — SIGNIFICANT CHANGE UP (ref 27–31)
MCHC RBC-ENTMCNC: 31.3 G/DL — LOW (ref 32–37)
MCV RBC AUTO: 92.4 FL — SIGNIFICANT CHANGE UP (ref 80–94)
NRBC # BLD: 0 /100 WBCS — SIGNIFICANT CHANGE UP (ref 0–0)
PLATELET # BLD AUTO: 356 K/UL — SIGNIFICANT CHANGE UP (ref 130–400)
POTASSIUM SERPL-MCNC: 4.7 MMOL/L — SIGNIFICANT CHANGE UP (ref 3.5–5)
POTASSIUM SERPL-SCNC: 4.7 MMOL/L — SIGNIFICANT CHANGE UP (ref 3.5–5)
RBC # BLD: 3.7 M/UL — LOW (ref 4.7–6.1)
RBC # FLD: 13.2 % — SIGNIFICANT CHANGE UP (ref 11.5–14.5)
SODIUM SERPL-SCNC: 138 MMOL/L — SIGNIFICANT CHANGE UP (ref 135–146)
WBC # BLD: 12.39 K/UL — HIGH (ref 4.8–10.8)
WBC # FLD AUTO: 12.39 K/UL — HIGH (ref 4.8–10.8)

## 2020-03-28 PROCEDURE — 99233 SBSQ HOSP IP/OBS HIGH 50: CPT

## 2020-03-28 RX ORDER — METOPROLOL TARTRATE 50 MG
25 TABLET ORAL
Refills: 0 | Status: DISCONTINUED | OUTPATIENT
Start: 2020-03-28 | End: 2020-03-31

## 2020-03-28 RX ORDER — FUROSEMIDE 40 MG
40 TABLET ORAL DAILY
Refills: 0 | Status: DISCONTINUED | OUTPATIENT
Start: 2020-03-28 | End: 2020-03-29

## 2020-03-28 RX ORDER — RIVAROXABAN 15 MG-20MG
15 KIT ORAL DAILY
Refills: 0 | Status: DISCONTINUED | OUTPATIENT
Start: 2020-03-29 | End: 2020-03-30

## 2020-03-28 RX ADMIN — CHLORHEXIDINE GLUCONATE 1 APPLICATION(S): 213 SOLUTION TOPICAL at 05:35

## 2020-03-28 RX ADMIN — PANTOPRAZOLE SODIUM 40 MILLIGRAM(S): 20 TABLET, DELAYED RELEASE ORAL at 05:36

## 2020-03-28 RX ADMIN — HEPARIN SODIUM 5000 UNIT(S): 5000 INJECTION INTRAVENOUS; SUBCUTANEOUS at 05:38

## 2020-03-28 RX ADMIN — Medication 40 MILLIGRAM(S): at 05:36

## 2020-03-28 RX ADMIN — Medication 1: at 18:10

## 2020-03-28 RX ADMIN — Medication 1: at 08:07

## 2020-03-28 RX ADMIN — SIMVASTATIN 20 MILLIGRAM(S): 20 TABLET, FILM COATED ORAL at 21:43

## 2020-03-28 RX ADMIN — Medication 1: at 11:56

## 2020-03-28 RX ADMIN — Medication 12.5 MILLIGRAM(S): at 05:37

## 2020-03-28 NOTE — PROGRESS NOTE ADULT - SUBJECTIVE AND OBJECTIVE BOX
CC.  SOB/Atrial fib  HPI.  Patient reports SOB and LE edema are improving.  afebrile.  Offers no other complaints                Constitutional: No fever, fatigue or weight loss.  Skin: No rash.  Eyes: No recent vision problems or eye pain.  ENT: No congestion, ear pain, or sore throat.  Endocrine: No thyroid problems.  Cardiovascular: No chest pain or palpation.  Respiratory: No cough, congestion, or wheezing.  Gastrointestinal: No abdominal pain, nausea, vomiting, or diarrhea.  Genitourinary: No dysuria.  Musculoskeletal: No joint swelling.  Neurologic: No headache.      Vital Signs Last 24 Hrs  T(C): 35.9 (28 Mar 2020 10:45), Max: 36.2 (28 Mar 2020 08:53)  T(F): 96.7 (28 Mar 2020 10:45), Max: 97.1 (28 Mar 2020 08:53)  HR: 95 (28 Mar 2020 10:45) (90 - 96)  BP: 101/65 (28 Mar 2020 10:45) (101/65 - 140/62)  BP(mean): --  RR: 16 (28 Mar 2020 05:15) (16 - 18)  SpO2: 98% (28 Mar 2020 08:53) (98% - 98%)        PHYSICAL EXAM-  GENERAL: NAD,    HEAD:  Atraumatic, Normocephalic  EYES: EOMI, PERRLA, conjunctiva and sclera clear  NECK: Supple, No JVD, Normal thyroid  NERVOUS SYSTEM:  Alert & Oriented X3, Moving all extremities  CHEST/LUNG: Min crackles with good air entry  HEART: Regular rate and rhythm; No murmurs, rubs, or gallops  ABDOMEN: Soft, Nontender, Nondistended; Bowel sounds present  EXTREMITIES:    No clubbing, cyanosis, or edema  SKIN: No rashes or lesions                                   10.7   12.39 )-----------( 356      ( 28 Mar 2020 09:05 )             34.2     03-28    138  |  102  |  71<HH>  ----------------------------<  166<H>  4.7   |  20  |  3.8<H>    Ca    8.0<L>      28 Mar 2020 09:05      CARDIAC MARKERS ( 26 Mar 2020 21:33 )  x     / 0.04 ng/mL / 220 U/L / x     / 6.1 ng/mL  CARDIAC MARKERS ( 26 Mar 2020 15:21 )  x     / 0.04 ng/mL / 263 U/L / x     / 6.7 ng/mL                          MEDICATIONS  (STANDING):  chlorhexidine 4% Liquid 1 Application(s) Topical <User Schedule>  dextrose 5%. 1000 milliLiter(s) (50 mL/Hr) IV Continuous <Continuous>  dextrose 50% Injectable 12.5 Gram(s) IV Push once  dextrose 50% Injectable 25 Gram(s) IV Push once  dextrose 50% Injectable 25 Gram(s) IV Push once  furosemide    Tablet 40 milliGRAM(s) Oral daily  heparin  Injectable 5000 Unit(s) SubCutaneous every 12 hours  insulin lispro (HumaLOG) corrective regimen sliding scale   SubCutaneous three times a day before meals  metoprolol tartrate 25 milliGRAM(s) Oral two times a day  pantoprazole    Tablet 40 milliGRAM(s) Oral before breakfast  simvastatin 20 milliGRAM(s) Oral at bedtime    MEDICATIONS  (PRN):  dextrose 40% Gel 15 Gram(s) Oral once PRN Blood Glucose LESS THAN 70 milliGRAM(s)/deciliter  glucagon  Injectable 1 milliGRAM(s) IntraMuscular once PRN Glucose LESS THAN 70 milligrams/deciliter            RADIOLOGY RESULTS:

## 2020-03-28 NOTE — PROGRESS NOTE ADULT - ASSESSMENT
Patient is 68 yo male with history of DM 2, HLD, charcot's foot, home bound admitted for     1. Acute respiratory hypoxic respiratory failure  Possible due to acute CHF with peserved EF  Improving  Switch to po lasix  TTE shows NL EF  monitor I/O  keep negative output       2.  atrial fib  -Continue with BB for rate control  Anticoagulation when stable   -Cardiology to follow up     3. ADAL on CKD stage 4  -continue with rayo  -renal US shows no acute process  -Creatinine level elevated.  Possible due to diuresis  -Switch to po lasix  -Monitor renal function  - Nephrologist to follow up        4 DM2  - lantus and SSI  - Diabetic protocol    5. Hypercholesterolemia  - Statin    DVT prophylaxis heparin  Code full Code    #Progress Note Handoff  Pending (specify):  placement.  improvement of renal function  Family discussion:  plan of care was discussed with patient  in details.  all questions were answered.  seems to understand, and in agreement  Disposition:  unknown

## 2020-03-29 LAB
ANION GAP SERPL CALC-SCNC: 21 MMOL/L — HIGH (ref 7–14)
BUN SERPL-MCNC: 75 MG/DL — CRITICAL HIGH (ref 10–20)
CALCIUM SERPL-MCNC: 7.9 MG/DL — LOW (ref 8.5–10.1)
CHLORIDE SERPL-SCNC: 101 MMOL/L — SIGNIFICANT CHANGE UP (ref 98–110)
CO2 SERPL-SCNC: 15 MMOL/L — LOW (ref 17–32)
CREAT SERPL-MCNC: 4.3 MG/DL — CRITICAL HIGH (ref 0.7–1.5)
GLUCOSE BLDC GLUCOMTR-MCNC: 165 MG/DL — HIGH (ref 70–99)
GLUCOSE BLDC GLUCOMTR-MCNC: 165 MG/DL — HIGH (ref 70–99)
GLUCOSE BLDC GLUCOMTR-MCNC: 180 MG/DL — HIGH (ref 70–99)
GLUCOSE BLDC GLUCOMTR-MCNC: 181 MG/DL — HIGH (ref 70–99)
GLUCOSE SERPL-MCNC: 176 MG/DL — HIGH (ref 70–99)
HCT VFR BLD CALC: 37.3 % — LOW (ref 42–52)
HGB BLD-MCNC: 11.6 G/DL — LOW (ref 14–18)
MCHC RBC-ENTMCNC: 29 PG — SIGNIFICANT CHANGE UP (ref 27–31)
MCHC RBC-ENTMCNC: 31.1 G/DL — LOW (ref 32–37)
MCV RBC AUTO: 93.3 FL — SIGNIFICANT CHANGE UP (ref 80–94)
NRBC # BLD: 0 /100 WBCS — SIGNIFICANT CHANGE UP (ref 0–0)
PLATELET # BLD AUTO: 302 K/UL — SIGNIFICANT CHANGE UP (ref 130–400)
POTASSIUM SERPL-MCNC: 4.9 MMOL/L — SIGNIFICANT CHANGE UP (ref 3.5–5)
POTASSIUM SERPL-SCNC: 4.9 MMOL/L — SIGNIFICANT CHANGE UP (ref 3.5–5)
RBC # BLD: 4 M/UL — LOW (ref 4.7–6.1)
RBC # FLD: 13.2 % — SIGNIFICANT CHANGE UP (ref 11.5–14.5)
SODIUM SERPL-SCNC: 137 MMOL/L — SIGNIFICANT CHANGE UP (ref 135–146)
WBC # BLD: 9.98 K/UL — SIGNIFICANT CHANGE UP (ref 4.8–10.8)
WBC # FLD AUTO: 9.98 K/UL — SIGNIFICANT CHANGE UP (ref 4.8–10.8)

## 2020-03-29 PROCEDURE — 99233 SBSQ HOSP IP/OBS HIGH 50: CPT

## 2020-03-29 PROCEDURE — 71045 X-RAY EXAM CHEST 1 VIEW: CPT | Mod: 26

## 2020-03-29 RX ADMIN — RIVAROXABAN 15 MILLIGRAM(S): KIT at 10:54

## 2020-03-29 RX ADMIN — SIMVASTATIN 20 MILLIGRAM(S): 20 TABLET, FILM COATED ORAL at 21:54

## 2020-03-29 RX ADMIN — Medication 25 MILLIGRAM(S): at 05:48

## 2020-03-29 RX ADMIN — Medication 25 MILLIGRAM(S): at 17:29

## 2020-03-29 RX ADMIN — Medication 40 MILLIGRAM(S): at 05:45

## 2020-03-29 RX ADMIN — PANTOPRAZOLE SODIUM 40 MILLIGRAM(S): 20 TABLET, DELAYED RELEASE ORAL at 05:45

## 2020-03-29 RX ADMIN — Medication 1: at 16:58

## 2020-03-29 RX ADMIN — Medication 1: at 08:05

## 2020-03-29 RX ADMIN — CHLORHEXIDINE GLUCONATE 1 APPLICATION(S): 213 SOLUTION TOPICAL at 05:45

## 2020-03-29 NOTE — CHART NOTE - NSCHARTNOTEFT_GEN_A_CORE
I was called to talk to the patient   patient is complaining of SOB now.  afebrile.  No medical contraindication for discharge cough  reports pain with swallowing    offers no other complaints  Vital Signs Last 24 Hrs  T(C): 37.1 (29 Mar 2020 13:58), Max: 37.1 (29 Mar 2020 13:58)  T(F): 98.7 (29 Mar 2020 13:58), Max: 98.7 (29 Mar 2020 13:58)  HR: 90 (29 Mar 2020 13:58) (85 - 97)  BP: 108/55 (29 Mar 2020 13:58) (106/57 - 125/74)  BP(mean): --  RR: 18 (29 Mar 2020 13:58) (16 - 18)  SpO2: 96% (29 Mar 2020 18:23) (96% - 98%)      PHYSICAL EXAM-  GENERAL: NAD,   HEAD:  Atraumatic, Normocephalic  EYES: EOMI, PERRLA, conjunctiva and sclera clear  NECK: Supple, No JVD, Normal thyroid  NERVOUS SYSTEM:  Alert & Oriented X3, Moving all extremities  CHEST/LUNG: MIn crackles with good air entry  HEART: Regular rate and rhythm; No murmurs, rubs, or gallops  ABDOMEN: Soft, Nontender, Nondistended; Bowel sounds present  EXTREMITIES:   No clubbing, cyanosis, or edema  SKIN: No rashes or lesions  A/P SOB.  SPO2 98% on 2l.  Stat CXR  Pain with swallowing, GI consult

## 2020-03-29 NOTE — PROGRESS NOTE ADULT - SUBJECTIVE AND OBJECTIVE BOX
CC.  SOB/Atrial fib  HPI.  Patient reports SOB and LE edema are resolving.  afebrile.  Offers no other complaints                Constitutional: No fever, fatigue or weight loss.  Skin: No rash.  Eyes: No recent vision problems or eye pain.  ENT: No congestion, ear pain, or sore throat.  Endocrine: No thyroid problems.  Cardiovascular: No chest pain or palpation.  Respiratory: No cough, congestion, or wheezing.  Gastrointestinal: No abdominal pain, nausea, vomiting, or diarrhea.  Genitourinary: No dysuria.  Musculoskeletal: No joint swelling.  Neurologic: No headache.      Vital Signs Last 24 Hrs  T(C): 35.6 (29 Mar 2020 05:16), Max: 35.9 (28 Mar 2020 10:45)  T(F): 96.1 (29 Mar 2020 05:16), Max: 96.7 (28 Mar 2020 10:45)  HR: 97 (29 Mar 2020 05:16) (85 - 97)  BP: 125/74 (29 Mar 2020 05:16) (101/65 - 125/74)  BP(mean): --  RR: 16 (29 Mar 2020 05:16) (16 - 16)  SpO2: 98% (29 Mar 2020 09:28) (98% - 98%)        PHYSICAL EXAM-  GENERAL: NAD,    HEAD:  Atraumatic, Normocephalic  EYES: EOMI, PERRLA, conjunctiva and sclera clear  NECK: Supple, No JVD, Normal thyroid  NERVOUS SYSTEM:  Alert & Oriented X3, Moving all extremities  CHEST/LUNG: Min crackles with good air entry  HEART: Regular rate and rhythm; No murmurs, rubs, or gallops  ABDOMEN: Soft, Nontender, Nondistended; Bowel sounds present  EXTREMITIES:    No clubbing, cyanosis, or edema  SKIN: No rashes or lesions                                 10.7   12.39 )-----------( 356      ( 28 Mar 2020 09:05 )             34.2     03-28    138  |  102  |  71<HH>  ----------------------------<  166<H>  4.7   |  20  |  3.8<H>    Ca    8.0<L>      28 Mar 2020 09:05      MEDICATIONS  (STANDING):  chlorhexidine 4% Liquid 1 Application(s) Topical <User Schedule>  dextrose 5%. 1000 milliLiter(s) (50 mL/Hr) IV Continuous <Continuous>  dextrose 50% Injectable 12.5 Gram(s) IV Push once  dextrose 50% Injectable 25 Gram(s) IV Push once  dextrose 50% Injectable 25 Gram(s) IV Push once  furosemide    Tablet 40 milliGRAM(s) Oral daily  insulin lispro (HumaLOG) corrective regimen sliding scale   SubCutaneous three times a day before meals  metoprolol tartrate 25 milliGRAM(s) Oral two times a day  pantoprazole    Tablet 40 milliGRAM(s) Oral before breakfast  rivaroxaban 15 milliGRAM(s) Oral daily  simvastatin 20 milliGRAM(s) Oral at bedtime    MEDICATIONS  (PRN):  dextrose 40% Gel 15 Gram(s) Oral once PRN Blood Glucose LESS THAN 70 milliGRAM(s)/deciliter  glucagon  Injectable 1 milliGRAM(s) IntraMuscular once PRN Glucose LESS THAN 70 milligrams/deciliter              RADIOLOGY RESULTS:

## 2020-03-29 NOTE — PROGRESS NOTE ADULT - ASSESSMENT
Patient is 68 yo male with history of DM 2, HLD, charcot's foot, home bound admitted for     1. Acute respiratory hypoxic respiratory failure  Possible due to acute CHF with peserved EF  Improving  continue with po lasix  TTE shows NL EF  monitor I/O  keep negative output       2.  atrial fib  -Continue with BB for rate control  Anticoagulation when stable   -Cardiology to follow up     3. ADAL on CKD stage 4  -continue with rayo  -renal US shows no acute process  -Creatinine level elevated.  Possible due to diuresis  -PO lasix  -Monitor renal function  - Nephrologist to follow up        4 DM2  - lantus and SSI  - Diabetic protocol    5. Hypercholesterolemia  - Statin    DVT prophylaxis heparin  Code full Code    #Progress Note Handoff  Pending (specify):  placement.  improvement of renal function  Family discussion:  plan of care was discussed with patient  in details.  all questions were answered.  seems to understand, and in agreement  Disposition:  unknown Patient is 68 yo male with history of DM 2, HLD, charcot's foot, home bound admitted for     1. Acute respiratory hypoxic respiratory failure  Possible due to acute CHF with peserved EF  Improving  Hold off on po lasix  TTE shows NL EF  monitor I/O  keep negative output       2.  atrial fib  -Continue with BB for rate control  Anticoagulation when stable   -Cardiology to follow up     3. ADAL on CKD stage 4  -continue with rayo  -renal US shows no acute process  -Creatinine level elevated.  Possible due to diuresis  -hold PO lasix  -Monitor renal function  - Nephrologist to follow up        4 DM2  - lantus and SSI  - Diabetic protocol    5. Hypercholesterolemia  - Statin    DVT prophylaxis heparin  Code full Code    #Progress Note Handoff  Pending (specify):  placement.  improvement of renal function  Family discussion:  plan of care was discussed with patient  in details.  all questions were answered.  seems to understand, and in agreement  Disposition:  unknown

## 2020-03-30 LAB
ANION GAP SERPL CALC-SCNC: 21 MMOL/L — HIGH (ref 7–14)
BUN SERPL-MCNC: 85 MG/DL — CRITICAL HIGH (ref 10–20)
CALCIUM SERPL-MCNC: 7.8 MG/DL — LOW (ref 8.5–10.1)
CHLORIDE SERPL-SCNC: 99 MMOL/L — SIGNIFICANT CHANGE UP (ref 98–110)
CO2 SERPL-SCNC: 18 MMOL/L — SIGNIFICANT CHANGE UP (ref 17–32)
CREAT SERPL-MCNC: 5 MG/DL — CRITICAL HIGH (ref 0.7–1.5)
GLUCOSE BLDC GLUCOMTR-MCNC: 159 MG/DL — HIGH (ref 70–99)
GLUCOSE BLDC GLUCOMTR-MCNC: 170 MG/DL — HIGH (ref 70–99)
GLUCOSE BLDC GLUCOMTR-MCNC: 175 MG/DL — HIGH (ref 70–99)
GLUCOSE BLDC GLUCOMTR-MCNC: 190 MG/DL — HIGH (ref 70–99)
GLUCOSE SERPL-MCNC: 190 MG/DL — HIGH (ref 70–99)
HCT VFR BLD CALC: 35.1 % — LOW (ref 42–52)
HGB BLD-MCNC: 11 G/DL — LOW (ref 14–18)
MAGNESIUM SERPL-MCNC: 2.3 MG/DL — SIGNIFICANT CHANGE UP (ref 1.8–2.4)
MCHC RBC-ENTMCNC: 29 PG — SIGNIFICANT CHANGE UP (ref 27–31)
MCHC RBC-ENTMCNC: 31.3 G/DL — LOW (ref 32–37)
MCV RBC AUTO: 92.6 FL — SIGNIFICANT CHANGE UP (ref 80–94)
NRBC # BLD: 0 /100 WBCS — SIGNIFICANT CHANGE UP (ref 0–0)
PHOSPHATE SERPL-MCNC: 7.6 MG/DL — HIGH (ref 2.1–4.9)
PLATELET # BLD AUTO: 325 K/UL — SIGNIFICANT CHANGE UP (ref 130–400)
POTASSIUM SERPL-MCNC: 4.9 MMOL/L — SIGNIFICANT CHANGE UP (ref 3.5–5)
POTASSIUM SERPL-SCNC: 4.9 MMOL/L — SIGNIFICANT CHANGE UP (ref 3.5–5)
RBC # BLD: 3.79 M/UL — LOW (ref 4.7–6.1)
RBC # FLD: 13.4 % — SIGNIFICANT CHANGE UP (ref 11.5–14.5)
SODIUM SERPL-SCNC: 138 MMOL/L — SIGNIFICANT CHANGE UP (ref 135–146)
WBC # BLD: 9.23 K/UL — SIGNIFICANT CHANGE UP (ref 4.8–10.8)
WBC # FLD AUTO: 9.23 K/UL — SIGNIFICANT CHANGE UP (ref 4.8–10.8)

## 2020-03-30 PROCEDURE — 99233 SBSQ HOSP IP/OBS HIGH 50: CPT

## 2020-03-30 PROCEDURE — 99223 1ST HOSP IP/OBS HIGH 75: CPT

## 2020-03-30 RX ORDER — FUROSEMIDE 40 MG
40 TABLET ORAL
Refills: 0 | Status: DISCONTINUED | OUTPATIENT
Start: 2020-03-30 | End: 2020-03-31

## 2020-03-30 RX ORDER — CALCIUM ACETATE 667 MG
1334 TABLET ORAL
Refills: 0 | Status: DISCONTINUED | OUTPATIENT
Start: 2020-03-30 | End: 2020-03-31

## 2020-03-30 RX ADMIN — SIMVASTATIN 20 MILLIGRAM(S): 20 TABLET, FILM COATED ORAL at 21:53

## 2020-03-30 RX ADMIN — Medication 25 MILLIGRAM(S): at 05:59

## 2020-03-30 RX ADMIN — Medication 1334 MILLIGRAM(S): at 11:55

## 2020-03-30 RX ADMIN — Medication 25 MILLIGRAM(S): at 17:06

## 2020-03-30 RX ADMIN — Medication 1: at 11:55

## 2020-03-30 RX ADMIN — PANTOPRAZOLE SODIUM 40 MILLIGRAM(S): 20 TABLET, DELAYED RELEASE ORAL at 06:00

## 2020-03-30 RX ADMIN — Medication 1: at 08:23

## 2020-03-30 RX ADMIN — Medication 1: at 17:05

## 2020-03-30 RX ADMIN — Medication 40 MILLIGRAM(S): at 21:53

## 2020-03-30 RX ADMIN — Medication 1334 MILLIGRAM(S): at 17:06

## 2020-03-30 RX ADMIN — Medication 40 MILLIGRAM(S): at 10:44

## 2020-03-30 NOTE — CONSULT NOTE ADULT - ASSESSMENT
69yMale pmh DM, high cholesterol, Charcot ankle right foot: presents with progressive SOB for 1 week. Patient developed new onset odynophagia for 3 days.    Problem 1-New onset odynophagia for 3 days   Rec  -Optimize cardio-pulm status   -Patient very short of breath, bibasilar opacities/effusions, dry cough consider COVID-19 testing   -Follow up with patient's private GI Dr. Lopez for outpatient esophagram and EGD evaluation  -The benefits of endoscopy as well as the risks, such as GI bleeding, aspiration pneumonia, perforation, damage or loss of teeth, reaction to medication, or death  were reviewed with the patient. 69yMale pmh DM, high cholesterol, Charcot ankle right foot: presents with progressive SOB for 1 week. Patient developed new onset odynophagia for 3 days.    Problem 1-New onset odynophagia for 3 days   Rec  -Optimize cardio-pulm status   -Patient very short of breath, bibasilar opacities/effusions, dry cough consider COVID-19 testing   -Follow up with patient's private GI Dr. Lopez for outpatient esophagram and EGD evaluation  -The benefits of endoscopy as well as the risks, such as GI bleeding, aspiration pneumonia, perforation, damage or loss of teeth, reaction to medication, or death  were reviewed with the patient.       Recall GI if needed

## 2020-03-30 NOTE — PROGRESS NOTE ADULT - SUBJECTIVE AND OBJECTIVE BOX
CC.  SOB/Atrial fib  HPI.  Patient reports SOB and LE edema are resolving.  afebrile.  Offers no other complaints        Constitutional: No fever, fatigue or weight loss.  Skin: No rash.  Eyes: No recent vision problems or eye pain.  ENT: No congestion, ear pain, or sore throat.  Endocrine: No thyroid problems.  Cardiovascular: No chest pain or palpation.  Respiratory: No cough, congestion, or wheezing.  Gastrointestinal: No abdominal pain, nausea, vomiting, or diarrhea.  Genitourinary: No dysuria.  Musculoskeletal: No joint swelling.  Neurologic: No headache.      Vital Signs Last 24 Hrs  T(C): 35.6 (30 Mar 2020 05:29), Max: 37.1 (29 Mar 2020 13:58)  T(F): 96.1 (30 Mar 2020 05:29), Max: 98.7 (29 Mar 2020 13:58)  HR: 89 (30 Mar 2020 05:29) (89 - 91)  BP: 112/56 (30 Mar 2020 05:29) (108/55 - 112/56)  BP(mean): --  RR: 18 (30 Mar 2020 05:29) (18 - 18)  SpO2: 98% (30 Mar 2020 08:24) (96% - 98%)        PHYSICAL EXAM-  GENERAL: NAD,    HEAD:  Atraumatic, Normocephalic  EYES: EOMI, PERRLA, conjunctiva and sclera clear  NECK: Supple, No JVD, Normal thyroid  NERVOUS SYSTEM:  Alert & Oriented X3, Moving all extremities  CHEST/LUNG: Mild crackles with good air entry  HEART: Regular rate and rhythm; No murmurs, rubs, or gallops  ABDOMEN: Soft, Nontender, Nondistended; Bowel sounds present  EXTREMITIES:    No clubbing, cyanosis, or edema  SKIN: No rashes or lesions                          11.0   9.23  )-----------( 325      ( 30 Mar 2020 06:47 )             35.1     03-30    138  |  99  |  85<HH>  ----------------------------<  190<H>  4.9   |  18  |  5.0<HH>    Ca    7.8<L>      30 Mar 2020 06:47  Phos  7.6     03-30  Mg     2.3     03-30                  MEDICATIONS  (STANDING):  calcium acetate 1334 milliGRAM(s) Oral three times a day with meals  chlorhexidine 4% Liquid 1 Application(s) Topical <User Schedule>  dextrose 5%. 1000 milliLiter(s) (50 mL/Hr) IV Continuous <Continuous>  dextrose 50% Injectable 12.5 Gram(s) IV Push once  dextrose 50% Injectable 25 Gram(s) IV Push once  dextrose 50% Injectable 25 Gram(s) IV Push once  furosemide   Injectable 40 milliGRAM(s) IV Push two times a day  insulin lispro (HumaLOG) corrective regimen sliding scale   SubCutaneous three times a day before meals  metoprolol tartrate 25 milliGRAM(s) Oral two times a day  pantoprazole    Tablet 40 milliGRAM(s) Oral before breakfast  simvastatin 20 milliGRAM(s) Oral at bedtime    MEDICATIONS  (PRN):  dextrose 40% Gel 15 Gram(s) Oral once PRN Blood Glucose LESS THAN 70 milliGRAM(s)/deciliter  glucagon  Injectable 1 milliGRAM(s) IntraMuscular once PRN Glucose LESS THAN 70 milligrams/deciliter        Imaging Personally Reviewed:     [x ] YES  [ ] NO    Consultant(s) Notes Reviewed:  [x ] YES  [ ] NO    Care Discussed with Consultants/Other Providers [x ] YES  [ ] No medical contraindication for discharge

## 2020-03-30 NOTE — CHART NOTE - NSCHARTNOTEFT_GEN_A_CORE
Pt with acute  hypoxic respiratory failure. CXR showed increased bibasilar opacities/effusions.  Dr. Ruiz d/w ID who recommended r/o COVID-19.  Pt swabbed and specimen dropped at 1200.      Case d/w Dr. Ruiz

## 2020-03-30 NOTE — PROGRESS NOTE ADULT - ASSESSMENT
Patient is 70 yo male with history of DM 2, HLD, charcot's foot, home bound admitted for     1. Acute respiratory hypoxic respiratory failure  Possible due to acute CHF with peserved EF  Repeat CXR shows increased bibasilar opacities/effusions.  Nephrology recommended IV Lasix with metazolone   Patient is afebrile, WBC WNL.  Discussed case with ID who recommended to test for COVID19.  Precaution ordered   TTE shows NL EF  monitor I/O  keep negative output       2.  atrial fib  Continue with BB for rate control  anticoagulation when stable  Cardiology to follow up     3. ADAL on CKD stage 4  -continue with rayo  -renal US shows no acute process  -Creatinine level elevated today  -IV lasix  -Monitor I/O  -Keep negative output  -further workup pending  - Nephrologist to follow up        4 DM2  - lantus and SSI  - Diabetic protocol    5. Hypercholesterolemia  - Statin    DVT prophylaxis heparin  Code full Code    #Progress Note Handoff  Pending (specify):  placement.  improvement of renal function  Family discussion:  plan of care was discussed with patient  in details.  all questions were answered.  seems to understand, and in agreement.  Prognosis guarded  Disposition:  unknown Patient is 68 yo male with history of DM 2, HLD, charcot's foot, home bound admitted for     1. Acute  hypoxic respiratory failure  Possible due to acute CHF with peserved EF  Repeat CXR shows increased bibasilar opacities/effusions.  Nephrology recommended IV Lasix with metazolone   Patient is afebrile, WBC WNL.  Discussed case with ID who recommended to test for COVID19.  Precaution ordered   TTE shows NL EF  monitor I/O  keep negative output       2.  atrial fib  Continue with BB for rate control  anticoagulation when stable  Cardiology to follow up     3. ADAL on CKD stage 4  -continue with rayo  -renal US shows no acute process  -Creatinine level elevated today  -IV lasix  -Monitor I/O  -Keep negative output  -further workup pending  - Nephrologist to follow up        4 DM2  - lantus and SSI  - Diabetic protocol    5. Hypercholesterolemia  - Statin    DVT prophylaxis heparin  Code full Code    #Progress Note Handoff  Pending (specify):  placement.  improvement of renal function  Family discussion:  plan of care was discussed with patient  in details.  all questions were answered.  seems to understand, and in agreement.  Prognosis guarded  Disposition:  unknown

## 2020-03-30 NOTE — PROGRESS NOTE ADULT - ASSESSMENT
Pt with DM, obesity, CKD admitted with chest pressure and increased LE edema.  Admitted found to have Afib.    ADAL on CKD4 - creat 2.6 mg% from 2 weeks PTA (his PMD, MARISEL Orr)  - nonoliguric, kidney sono no obstruction - preserved size kidneys  - likely due to DM and cardiorenal syndrome  - proteinuria ~2 gr/g creat  - obtain UA  - worsening creat and oliguria 400 cc yesterday UO with worsening fluid overload  Rx: Restart Lasix 40 IV q 12 mg plus Metolazone 2.5 mg po once a day  -ABG to assess severity of hypoxemia  - will need RRT soon, although pt is refusing at this time  - STOP XArelto - will need a line soon, check PT/PTT  - w/u for ADAL - SPEP, UPEP, serum and urine immunofixation, POOJA c3c4 hEP b c PROFILE, ANCA  High phos - renal diet, will start Phoslo 2 tabs qac    D/W Hospitalist    Will follow

## 2020-03-30 NOTE — CONSULT NOTE ADULT - SUBJECTIVE AND OBJECTIVE BOX
Chief complaint/Reason for consult: Painful swallowing     HPI:  · HPI Objective Statement: 69y M w/ PMH of DM Type 2, and Charcot joint: right foot: presents with progressive SOB for 1 week. Went to primary MD: Mike Villar at symptom onset. Was prescribed Aldactone 25mg at first which did not relieve dyspnea so lasix 40 mg was added. Both of which did not relieve his symptoms. Now presents to ER fo evaluation. -Denies chest pain -Reports increasing Pedal Edema with weeping legs. -Self ambulatory at home, uses a cane outside, ** Unable to ambulate presently.  -Has a home Nurse Practitioner who told patient he has Atrial Fibrillation: EKG was done about 6 days ago. Denied any occurrence of Atrial Fibrillation prior to this event. He was also informed he has Acute Renal Insufficiency. Denies prior Renal problems. -He was set up to then see Nephrologist Dr Grant this coming Monday. (26 Mar 2020 09:50)    GI Updates: 69yMale pmh DM, high cholesterol, Charcot ankle right foot: presents with progressive SOB for 1 week. Patient denies nausea, vomiting, hematemesis, melena, blood in stool, diarrhea, constipation, abdominal pain. Patient developed new onset odynophagia for 3 days. Patient reports he had a colonoscopy a long time ago by Dr. Lopez reportedly normal.      PAST MEDICAL & SURGICAL HISTORY:   Venous insufficiency  Diabetes: Type 2  High cholesterol  Charcot ankle: Right foot  S/P tonsillectomy        Family history:  FAMILY HISTORY:  Family history of breast cancer in mother    No GI cancers in first or second degree relatives    Social History: No smoking. No alcohol. No illegal drug use.    Allergies:   No Known Allergies        MEDICATIONS: Home Medications:  Aspir 81 oral delayed release tablet: 1 tab(s) orally once a day (26 Mar 2020 10:26)  enalapril 10 mg oral tablet: 1 tab(s) orally once a day (26 Mar 2020 10:26)  furosemide 40 mg oral tablet: 1 tab(s) orally once a day (26 Mar 2020 10:26)  Invokana 300 mg oral tablet: 1 tab(s) orally once a day (26 Mar 2020 10:26)  Lantus 100 units/mL subcutaneous solution: 36 unit(s) subcutaneous once a day (at bedtime) (26 Mar 2020 10:26)  simvastatin 20 mg oral tablet: 1 tab(s) orally once a day (at bedtime) (26 Mar 2020 10:26)  spironolactone 25 mg oral tablet:  (26 Mar 2020 10:26)    MEDICATIONS  (STANDING):  calcium acetate 1334 milliGRAM(s) Oral three times a day with meals  chlorhexidine 4% Liquid 1 Application(s) Topical <User Schedule>  dextrose 5%. 1000 milliLiter(s) (50 mL/Hr) IV Continuous <Continuous>  dextrose 50% Injectable 12.5 Gram(s) IV Push once  dextrose 50% Injectable 25 Gram(s) IV Push once  dextrose 50% Injectable 25 Gram(s) IV Push once  furosemide   Injectable 40 milliGRAM(s) IV Push two times a day  insulin lispro (HumaLOG) corrective regimen sliding scale   SubCutaneous three times a day before meals  metoprolol tartrate 25 milliGRAM(s) Oral two times a day  pantoprazole    Tablet 40 milliGRAM(s) Oral before breakfast  simvastatin 20 milliGRAM(s) Oral at bedtime    MEDICATIONS  (PRN):  dextrose 40% Gel 15 Gram(s) Oral once PRN Blood Glucose LESS THAN 70 milliGRAM(s)/deciliter  glucagon  Injectable 1 milliGRAM(s) IntraMuscular once PRN Glucose LESS THAN 70 milligrams/deciliter        REVIEW OF SYSTEMS  General:  No weight loss, fevers, or chills.  Eyes:  No reported pain or visual changes  ENT:  No sore throat or runny nose.  NECK: No stiffness or lymphadenopathy  CV:  No chest pain or palpitations.  Resp:  + shortness of breath, +cough, No wheezing or hemoptysis  GI:  No abdominal pain, nausea, vomiting, dysphagia, diarrhea or constipation. No rectal bleeding, melena, or hematemesis.  Neuro:  No tingling, numbness       VITALS:   T(F): 96.1 (03-30-20 @ 05:29), Max: 98.7 (03-29-20 @ 13:58)  HR: 89 (03-30-20 @ 05:29) (89 - 91)  BP: 112/56 (03-30-20 @ 05:29) (108/55 - 112/56)  RR: 18 (03-30-20 @ 05:29) (18 - 18)  SpO2: 98% (03-30-20 @ 08:24) (96% - 98%)    PHYSICAL EXAM:  GENERAL: AAOx3, no acute distress.  HEAD:  Atraumatic, Normocephalic  EYES: conjunctiva and sclera clear  NECK: Supple, No thyromegaly   CHEST/LUNG: +b/l opacities   HEART: Regular rate and rhythm; normal S1, S2, No murmurs.  ABDOMEN: Soft, nontender, nondistended; Bowel sounds present, no abdominal bruit, masses, or hepatosplenomegaly  NEUROLOGY: No asterixis or tremor  SKIN: Intact, no jaundice          LABS:  03-30    138  |  99  |  85<HH>  ----------------------------<  190<H>  4.9   |  18  |  5.0<HH>    Ca    7.8<L>      30 Mar 2020 06:47  Phos  7.6     03-30  Mg     2.3     03-30                            11.0   9.23  )-----------( 325      ( 30 Mar 2020 06:47 )             35.1           IMAGING:      < from: Xray Chest 1 View-PORTABLE IMMEDIATE (03.29.20 @ 19:00) >  EXAM:  XR CHEST PORTABLE IMMED 1V            PROCEDURE DATE:  03/29/2020            INTERPRETATION:  Clinical History / Reason for exam: Shortness of breath    Comparison : Chest radiograph 3/26/2020    Technique/Positioning: Frontal view of the chest. Patient is rotated    Findings:    Cardiac/mediastinum/hilum: Patient is rotated but appears similar to prior exam.    Lung parenchyma/Pleura: Increased bibasilar opacities/effusions.    Skeleton/soft tissues: Stable    Impression:      Increasedbibasilar opacities/effusions.                      AROLDO PATINO M.D., ATTENDING RADIOLOGIST  This document has been electronically signed. Mar 29 2020  7:11PM        < end of copied text >

## 2020-03-30 NOTE — PROGRESS NOTE ADULT - SUBJECTIVE AND OBJECTIVE BOX
Nephrology progress note    Patient is seen and examined, events over the last 24 h noted .  C/o of SOB while on po LAsix (reduced b/o rising creat)  CXR worsening.    Allergies:  No Known Allergies    Hospital Medications:   MEDICATIONS  (STANDING):  chlorhexidine 4% Liquid 1 Application(s) Topical <User Schedule>  dextrose 5%. 1000 milliLiter(s) (50 mL/Hr) IV Continuous <Continuous>  dextrose 50% Injectable 12.5 Gram(s) IV Push once  dextrose 50% Injectable 25 Gram(s) IV Push once  dextrose 50% Injectable 25 Gram(s) IV Push once  insulin lispro (HumaLOG) corrective regimen sliding scale   SubCutaneous three times a day before meals  metoprolol tartrate 25 milliGRAM(s) Oral two times a day  pantoprazole    Tablet 40 milliGRAM(s) Oral before breakfast  simvastatin 20 milliGRAM(s) Oral at bedtime        VITALS:  T(F): 96.1 (03-30-20 @ 05:29), Max: 98.7 (03-29-20 @ 13:58)  HR: 89 (03-30-20 @ 05:29)  BP: 112/56 (03-30-20 @ 05:29)  RR: 18 (03-30-20 @ 05:29)  SpO2: 98% (03-30-20 @ 08:24)  Wt(kg): --    03-29 @ 07:01  -  03-30 @ 07:00  --------------------------------------------------------  IN: 0 mL / OUT: 400 mL / NET: -400 mL          PHYSICAL EXAM:  Constitutional: NAD, O2Sat 98 on 2 L  HEENT: anicteric sclera, oropharynx clear, MMM  Neck: No JVD  Respiratory: decreased BS at bases, a few crackles  Cardiovascular: S1, S2, RRR  Gastrointestinal: BS+, soft, NT/ND  Extremities: 1+ peripheral edema  Neurological: A/O x 3, no focal deficits  : No CVA tenderness. No rayo.   Skin: No rashes  Vascular Access:    LABS:  03-30    138  |  99  |  85<HH>  ----------------------------<  190<H>  4.9   |  18  |  5.0<HH>    Ca    7.8<L>      30 Mar 2020 06:47  Phos  7.6     03-30  Mg     2.3     03-30                            11.0   9.23  )-----------( 325      ( 30 Mar 2020 06:47 )             35.1       Urine Studies:    Creatinine, Random Urine: 83 mg/dL (03-26 @ 14:18)  Sodium, Random Urine: 54.0 mmoL/L (03-26 @ 14:18)  Osmolality, Random Urine: 401 mos/kg (03-26 @ 14:18)    RADIOLOGY & ADDITIONAL STUDIES:

## 2020-03-31 VITALS — RESPIRATION RATE: 18 BRPM | SYSTOLIC BLOOD PRESSURE: 92 MMHG | HEART RATE: 96 BPM | DIASTOLIC BLOOD PRESSURE: 61 MMHG

## 2020-03-31 LAB
% ALBUMIN: 41.3 % — SIGNIFICANT CHANGE UP
% ALPHA 1: 10.4 % — SIGNIFICANT CHANGE UP
% ALPHA 2: 19.1 % — SIGNIFICANT CHANGE UP
% BETA: 14.1 % — SIGNIFICANT CHANGE UP
% GAMMA: 15.1 % — SIGNIFICANT CHANGE UP
ALBUMIN SERPL ELPH-MCNC: 2.4 G/DL — LOW (ref 3.6–5.5)
ALBUMIN/GLOB SERPL ELPH: 0.7 RATIO — SIGNIFICANT CHANGE UP
ALPHA1 GLOB SERPL ELPH-MCNC: 0.6 G/DL — HIGH (ref 0.1–0.4)
ALPHA2 GLOB SERPL ELPH-MCNC: 1.1 G/DL — HIGH (ref 0.5–1)
ANA TITR SER: NEGATIVE — SIGNIFICANT CHANGE UP
ANION GAP SERPL CALC-SCNC: 24 MMOL/L — HIGH (ref 7–14)
APTT BLD: 35.1 SEC — SIGNIFICANT CHANGE UP (ref 27–39.2)
AUTO DIFF PNL BLD: NEGATIVE — SIGNIFICANT CHANGE UP
B-GLOBULIN SERPL ELPH-MCNC: 0.8 G/DL — SIGNIFICANT CHANGE UP (ref 0.5–1)
BASE EXCESS BLDA CALC-SCNC: -10.5 MMOL/L — LOW (ref -2–2)
BASE EXCESS BLDA CALC-SCNC: -12.6 MMOL/L — LOW (ref -2–2)
BUN SERPL-MCNC: 96 MG/DL — CRITICAL HIGH (ref 10–20)
C-ANCA SER-ACNC: NEGATIVE — SIGNIFICANT CHANGE UP
C3 SERPL-MCNC: 171 MG/DL — HIGH (ref 81–157)
C4 SERPL-MCNC: 31 MG/DL — SIGNIFICANT CHANGE UP (ref 13–39)
CALCIUM SERPL-MCNC: 8.1 MG/DL — LOW (ref 8.5–10.1)
CHLORIDE SERPL-SCNC: 99 MMOL/L — SIGNIFICANT CHANGE UP (ref 98–110)
CO2 SERPL-SCNC: 15 MMOL/L — LOW (ref 17–32)
CREAT SERPL-MCNC: 4.9 MG/DL — CRITICAL HIGH (ref 0.7–1.5)
GAMMA GLOBULIN: 0.9 G/DL — SIGNIFICANT CHANGE UP (ref 0.6–1.6)
GLUCOSE BLDC GLUCOMTR-MCNC: 168 MG/DL — HIGH (ref 70–99)
GLUCOSE BLDC GLUCOMTR-MCNC: 181 MG/DL — HIGH (ref 70–99)
GLUCOSE BLDC GLUCOMTR-MCNC: 193 MG/DL — HIGH (ref 70–99)
GLUCOSE SERPL-MCNC: 180 MG/DL — HIGH (ref 70–99)
HAV IGM SER-ACNC: SIGNIFICANT CHANGE UP
HBV CORE IGM SER-ACNC: SIGNIFICANT CHANGE UP
HBV SURFACE AG SER-ACNC: SIGNIFICANT CHANGE UP
HCO3 BLDA-SCNC: 15 MMOL/L — LOW (ref 23–27)
HCO3 BLDA-SCNC: 17 MMOL/L — LOW (ref 23–27)
HCT VFR BLD CALC: 37.3 % — LOW (ref 42–52)
HCV AB S/CO SERPL IA: 0.15 S/CO — SIGNIFICANT CHANGE UP (ref 0–0.99)
HCV AB SERPL-IMP: SIGNIFICANT CHANGE UP
HGB BLD-MCNC: 11.5 G/DL — LOW (ref 14–18)
HOROWITZ INDEX BLDA+IHG-RTO: 21 — SIGNIFICANT CHANGE UP
HOROWITZ INDEX BLDA+IHG-RTO: 35 — SIGNIFICANT CHANGE UP
INR BLD: 1.27 RATIO — SIGNIFICANT CHANGE UP (ref 0.65–1.3)
INTERPRETATION SERPL IFE-IMP: SIGNIFICANT CHANGE UP
MCHC RBC-ENTMCNC: 28.8 PG — SIGNIFICANT CHANGE UP (ref 27–31)
MCHC RBC-ENTMCNC: 30.8 G/DL — LOW (ref 32–37)
MCV RBC AUTO: 93.5 FL — SIGNIFICANT CHANGE UP (ref 80–94)
NRBC # BLD: 0 /100 WBCS — SIGNIFICANT CHANGE UP (ref 0–0)
P-ANCA SER-ACNC: NEGATIVE — SIGNIFICANT CHANGE UP
PCO2 BLDA: 38 MMHG — SIGNIFICANT CHANGE UP (ref 38–42)
PCO2 BLDA: 43 MMHG — HIGH (ref 38–42)
PH BLDA: 7.2 — LOW (ref 7.38–7.42)
PH BLDA: 7.2 — LOW (ref 7.38–7.42)
PLATELET # BLD AUTO: 336 K/UL — SIGNIFICANT CHANGE UP (ref 130–400)
PO2 BLDA: 129 MMHG — HIGH (ref 78–95)
PO2 BLDA: 169 MMHG — HIGH (ref 78–95)
POTASSIUM SERPL-MCNC: 5.4 MMOL/L — HIGH (ref 3.5–5)
POTASSIUM SERPL-SCNC: 5.4 MMOL/L — HIGH (ref 3.5–5)
PROT PATTERN SERPL ELPH-IMP: SIGNIFICANT CHANGE UP
PROT SERPL-MCNC: 5.8 G/DL — LOW (ref 6–8.3)
PROT SERPL-MCNC: 5.8 G/DL — LOW (ref 6–8.3)
PROTHROM AB SERPL-ACNC: 14.6 SEC — HIGH (ref 9.95–12.87)
RBC # BLD: 3.99 M/UL — LOW (ref 4.7–6.1)
RBC # FLD: 13.5 % — SIGNIFICANT CHANGE UP (ref 11.5–14.5)
SAO2 % BLDA: 99 % — HIGH (ref 94–98)
SAO2 % BLDA: 99 % — HIGH (ref 94–98)
SARS-COV-2 RNA SPEC QL NAA+PROBE: SIGNIFICANT CHANGE UP
SODIUM SERPL-SCNC: 138 MMOL/L — SIGNIFICANT CHANGE UP (ref 135–146)
WBC # BLD: 9.07 K/UL — SIGNIFICANT CHANGE UP (ref 4.8–10.8)
WBC # FLD AUTO: 9.07 K/UL — SIGNIFICANT CHANGE UP (ref 4.8–10.8)

## 2020-03-31 PROCEDURE — 71045 X-RAY EXAM CHEST 1 VIEW: CPT | Mod: 26

## 2020-03-31 PROCEDURE — 99223 1ST HOSP IP/OBS HIGH 75: CPT | Mod: CS

## 2020-03-31 PROCEDURE — 99233 SBSQ HOSP IP/OBS HIGH 50: CPT

## 2020-03-31 RX ORDER — METOPROLOL TARTRATE 50 MG
12.5 TABLET ORAL
Refills: 0 | Status: DISCONTINUED | OUTPATIENT
Start: 2020-03-31 | End: 2020-03-31

## 2020-03-31 RX ORDER — SODIUM BICARBONATE 1 MEQ/ML
650 SYRINGE (ML) INTRAVENOUS THREE TIMES A DAY
Refills: 0 | Status: DISCONTINUED | OUTPATIENT
Start: 2020-03-31 | End: 2020-03-31

## 2020-03-31 RX ORDER — SODIUM BICARBONATE 1 MEQ/ML
650 SYRINGE (ML) INTRAVENOUS ONCE
Refills: 0 | Status: COMPLETED | OUTPATIENT
Start: 2020-03-31 | End: 2020-03-31

## 2020-03-31 RX ORDER — HEPARIN SODIUM 5000 [USP'U]/ML
5000 INJECTION INTRAVENOUS; SUBCUTANEOUS EVERY 12 HOURS
Refills: 0 | Status: DISCONTINUED | OUTPATIENT
Start: 2020-03-31 | End: 2020-03-31

## 2020-03-31 RX ORDER — MIDODRINE HYDROCHLORIDE 2.5 MG/1
5 TABLET ORAL THREE TIMES A DAY
Refills: 0 | Status: DISCONTINUED | OUTPATIENT
Start: 2020-03-31 | End: 2020-03-31

## 2020-03-31 RX ORDER — FUROSEMIDE 40 MG
80 TABLET ORAL ONCE
Refills: 0 | Status: COMPLETED | OUTPATIENT
Start: 2020-03-31 | End: 2020-03-31

## 2020-03-31 RX ORDER — SODIUM BICARBONATE 1 MEQ/ML
50 SYRINGE (ML) INTRAVENOUS ONCE
Refills: 0 | Status: DISCONTINUED | OUTPATIENT
Start: 2020-03-31 | End: 2020-03-31

## 2020-03-31 RX ADMIN — PANTOPRAZOLE SODIUM 40 MILLIGRAM(S): 20 TABLET, DELAYED RELEASE ORAL at 05:01

## 2020-03-31 RX ADMIN — Medication 80 MILLIGRAM(S): at 08:28

## 2020-03-31 RX ADMIN — Medication 25 MILLIGRAM(S): at 05:02

## 2020-03-31 RX ADMIN — Medication 650 MILLIGRAM(S): at 04:58

## 2020-03-31 RX ADMIN — Medication 1334 MILLIGRAM(S): at 16:55

## 2020-03-31 RX ADMIN — Medication 1: at 12:31

## 2020-03-31 RX ADMIN — Medication 40 MILLIGRAM(S): at 06:27

## 2020-03-31 RX ADMIN — HEPARIN SODIUM 5000 UNIT(S): 5000 INJECTION INTRAVENOUS; SUBCUTANEOUS at 18:39

## 2020-03-31 RX ADMIN — MIDODRINE HYDROCHLORIDE 5 MILLIGRAM(S): 2.5 TABLET ORAL at 15:57

## 2020-03-31 RX ADMIN — Medication 1: at 16:55

## 2020-03-31 NOTE — CONSULT NOTE ADULT - SUBJECTIVE AND OBJECTIVE BOX
[id number]    HPI:  · HPI Objective Statement: 69y M w/ PMH of DM Type 2, and Charcot joint: right foot: presents with progressive SOB for 1 week. Went to primary MD: Mike Villar at symptom onset. Was prescribed Aldactone 25mg at first which did not relieve dyspnea so lasix 40 mg was added. Both of which did not relieve his symptoms. Now presents to ER fo evaluation. -Denies chest pain -Reports increasing Pedal Edema with weeping legs. -Self ambulatory at home, uses a cane outside, ** Unable to ambulate presently.  -Has a home Nurse Practitioner who told patient he has Atrial Fibrillation: EKG was done about 6 days ago. Denied any occurrence of Atrial Fibrillation prior to this event. He was also informed he has Acute Renal Insufficiency. Denies prior Renal problems. -He was set up to then see Nephrologist Dr Grant this coming Monday. (26 Mar 2020 09:50)      CC/ HPI Patient is a 69y old  Male who presents with a chief complaint of Sob and new atrial fibrillation (31 Mar 2020 08:02)      SHORTNESS OF BREATH;OTHER HYPERVOLEMIA;ACUTE RENAL FAILURE;A-FIB NEW ONSET    Family history of breast cancer in mother        Venous insufficiency  Water on the lung  Diabetes  High cholesterol  HTN (hypertension)  Shortness of breath  Atrial fibrillation, new onset  High cholesterol  Acute renal failure, unspecified acute renal failure type  Shortness of breath  Charcot ankle  S/P tonsillectomy  SOB  Atrial fibrillation, new onset  Acute renal failure, unspecified acute renal failure type  Other hypervolemia          FAMILY HISTORY:  Family history of breast cancer in mother      No Known Allergies        Marital Status:  ( x  )    (   ) Single    (   )    (  )   Occupation:   Lives with: (  ) alone  (  ) children   (xx  ) spouse   (  ) parents  (  ) other  Recent Travel:     Substance Use (street drugs): (x  ) never used  (  ) other:  Tobacco Usage:  (   ) never smoked   (  x ) former smoker   (   ) current smoker  (     ) pack year  Alcohol Usage:      Home Medications:  Aspir 81 oral delayed release tablet: 1 tab(s) orally once a day (26 Mar 2020 10:26)  enalapril 10 mg oral tablet: 1 tab(s) orally once a day (26 Mar 2020 10:26)  furosemide 40 mg oral tablet: 1 tab(s) orally once a day (26 Mar 2020 10:26)  Invokana 300 mg oral tablet: 1 tab(s) orally once a day (26 Mar 2020 10:26)  Lantus 100 units/mL subcutaneous solution: 36 unit(s) subcutaneous once a day (at bedtime) (26 Mar 2020 10:26)  simvastatin 20 mg oral tablet: 1 tab(s) orally once a day (at bedtime) (26 Mar 2020 10:26)  spironolactone 25 mg oral tablet:  (26 Mar 2020 10:26)      MEDICATIONS  (STANDING):  calcium acetate 1334 milliGRAM(s) Oral three times a day with meals  chlorhexidine 4% Liquid 1 Application(s) Topical <User Schedule>  dextrose 5%. 1000 milliLiter(s) (50 mL/Hr) IV Continuous <Continuous>  dextrose 50% Injectable 12.5 Gram(s) IV Push once  dextrose 50% Injectable 25 Gram(s) IV Push once  dextrose 50% Injectable 25 Gram(s) IV Push once  furosemide   Injectable 40 milliGRAM(s) IV Push two times a day  insulin lispro (HumaLOG) corrective regimen sliding scale   SubCutaneous three times a day before meals  metoprolol tartrate 25 milliGRAM(s) Oral two times a day  pantoprazole    Tablet 40 milliGRAM(s) Oral before breakfast  simvastatin 20 milliGRAM(s) Oral at bedtime    MEDICATIONS  (PRN):  dextrose 40% Gel 15 Gram(s) Oral once PRN Blood Glucose LESS THAN 70 milliGRAM(s)/deciliter  glucagon  Injectable 1 milliGRAM(s) IntraMuscular once PRN Glucose LESS THAN 70 milligrams/deciliter          T(C): 35.6 (03-31-20 @ 05:43), Max: 35.6 (03-30-20 @ 14:53)  HR: 102 (03-31-20 @ 08:29) (78 - 102)  BP: 111/51 (03-31-20 @ 06:47) (93/55 - 111/51)  RR: 18 (03-31-20 @ 08:29) (18 - 18)  SpO2: 98% (03-31-20 @ 08:29) (96% - 99%)  ICU Vital Signs Last 24 Hrs  T(C): 35.6 (31 Mar 2020 05:43), Max: 35.6 (30 Mar 2020 14:53)  T(F): 96 (31 Mar 2020 05:43), Max: 96 (30 Mar 2020 14:53)  HR: 102 (31 Mar 2020 08:29) (78 - 102)  BP: 111/51 (31 Mar 2020 06:47) (93/55 - 111/51)  RR: 18 (31 Mar 2020 08:29) (18 - 18)  SpO2: 98% (31 Mar 2020 08:29) (96% - 99%)      I&O's Summary    30 Mar 2020 07:01  -  31 Mar 2020 07:00  --------------------------------------------------------  IN: 0 mL / OUT: 300 mL / NET: -300 mL        I&O's Detail    30 Mar 2020 07:01  -  31 Mar 2020 07:00  --------------------------------------------------------  IN:  Total IN: 0 mL    OUT:    Indwelling Catheter - Urethral: 300 mL  Total OUT: 300 mL    Total NET: -300 mL          Drug Dosing Weight  Height (cm): 185.4 (26 Mar 2020 11:19)  Weight (kg): 132.8 (26 Mar 2020 11:19)  BMI (kg/m2): 38.6 (26 Mar 2020 11:19)  BSA (m2): 2.53 (26 Mar 2020 11:19)    LABS:                          11.5   9.07  )-----------( 336      ( 31 Mar 2020 08:24 )             37.3       03-31    138  |  99  |  96<HH>  ----------------------------<  180<H>  5.4<H>   |  15<L>  |  4.9<HH>    Ca    8.1<L>      31 Mar 2020 08:24  Phos  7.6     03-30  Mg     2.3     03-30    TPro  5.8<L>  /  Alb  2.4<L>  /  TBili  x   /  DBili  x   /  AST  x   /  ALT  x   /  AlkPhos  x   03-30      LIVER FUNCTIONS - ( 30 Mar 2020 14:26 )  Alb: 2.4 g/dL / Pro: 5.8 g/dL / ALK PHOS: x     / ALT: x     / AST: x     / GGT: x             ABG - ( 31 Mar 2020 09:57 )  pH, Arterial: 7.20  pH, Blood: x     /  pCO2: 38    /  pO2: 169   / HCO3: 15    / Base Excess: -12.6 /  SaO2: 99            RADIOLOGY:  I have personally reviewed all chest and other pertinent radiology films.      REVIEW OF SYSTEMS:     · CONSTITUTIONAL:   no fever   no chills.      · EYES:   no discharge,   no irritation,    no visual changes.    · ENMT:   Ears: no ear pain and no hearing problems.  Nose: no nasal congestion and no nasal drainage.      · CARDIOVASCULAR:   no chest pain,   no swelling  no palpitations      · RESPIRATORY:  +SOB,  no wheezing ,  +respiratory difficulty  no sputum production    · GASTROINTESTINAL:   no abdominal pain,    no nausea   no vomiting.    · GENITOURINARY:  no dysuria,   no frequency,       · MUSCULOSKELETAL:   no back pain,   no neck pain,   no weakness.    · SKIN:   no pruritis,   no rashes.    · NEURO:   no loss of consciousness,   no headache,   +weakness.    · PSYCHIATRIC:   no known mental health issues  no anxiety  no depression        ALLERGIC/IMMUNOLOGIC:   No active allergic or immunologic issues    all other systems are negative        PHYSICAL EXAM:     · CONSTITUTIONAL:   Ill appearing,   well nourished,   NAD    · ENMT:   Airway patent,   Nasal mucosa clear.  Mouth with normal mucosa.   No thrush    · EYES:   Clear bilaterally,   pupils equal,   round and reactive to light.    · CARDIAC:   Normal rate,   regular rhythm.    Heart sounds S1, S2.   no thrills or bruits on palpitation  normal  cardiac impulse  No murmurs, no rubs or gallops on auscultation  no edema        CAROTID:   normal systolic impulse  no bruits    · RESPIRATORY:   rales at bases  normal chest expansion  mild tachypnea  no retractions or use of accessory muscles  palpation of chest is normal with no fremitus  percussion of chest demonstrates dullness b/l bases    · GASTROINTESTINAL:  Abdomen soft,   non-tender,   no masses  no guarding,   + BS      GENITOURINARY  normal genitalia for sex  no edema    · MUSCULOSKELETAL:   range of motion is not limited,  no clubbing, cyanosis  no petechiae    · NEUROLOGICAL:   Alert and oriented   no obvious focal deficits in cranial nerve areas  no motor or sensory deficits.      · SKIN:   Skin normal color for race,   warm,   dry and intact.       · PSYCHIATRIC:   Alert and oriented to person,   place, time/situation.       · HEME LYMPH:   no splenomegaly.  No cervical  lymphadenopathy.  no inguinal lymphadenopathy

## 2020-03-31 NOTE — CONSULT NOTE ADULT - ASSESSMENT
IMPRESSION:  CHF  b/l effusions  doubt COVID 19  metabolic acidosis from renal failure    SUGGEST:  trial HCO3 may help temporarily  will need RRT  US to see size of effusions  may need tap

## 2020-03-31 NOTE — PROGRESS NOTE ADULT - ASSESSMENT
Pt with DM, obesity, CKD admitted with chest pressure and increased LE edema.  Admitted found to have Afib.    ADAL on CKD4 - creat 2.6 mg% from 2 weeks PTA (his PMD, MARISEL Orr)  - oliguric, kidney sono no obstruction - preserved size kidneys  - likely due to DM and cardiorenal syndrome  - proteinuria ~2 gr/g creat  - obtain UA  - worsening creat and oliguria 400 cc yesterday UO with worsening fluid overload  Rx: Restart Lasix 40 IV q 12 mg plus Metolazone 2.5 mg po once a day  -ABG 3/30 with pH 7.2 - metabolic and respiratory acidosis  - will need RRT soon, agreed to it (consent being signed)  - XArelto STOPPED - will need a line soon, check PT/PTT  - w/u for ADAL - SPEP, UPEP, serum and urine immunofixation, POOJA c3c4 hEP b c PROFILE, ANCA  High phos - renal diet, will start Phoslo 2 tabs qac  REspiratory - severe SOB reported, will give LAsix 80 IVx1, FM 50%, repeat ABG  BEing ruled out for COVID19    Will follow

## 2020-03-31 NOTE — DISCHARGE NOTE FOR THE EXPIRED PATIENT - HOSPITAL COURSE
70yo male whose PMH included DM and HLD presented to the ER on March 26 due to shortness of breath. He had recently been started on diuretic for leg edema. Found to new onset Atrial Fibrillation with acute kidney injury (later felt to be ADAL on CKD stage 4). He was started on beta blockers for rate control and dur to evidence of acute CHF (I suspect systolic) cardiology and then nephrology suggested treatments with IV diuretics. Clinical status didn't significantly change until March 31 when he was found pulseless and apneic. "Code Blue" was called but despite extensive efforts for resuscitation, ROSC did not happen. Pronounced by myself

## 2020-03-31 NOTE — PROGRESS NOTE ADULT - REASON FOR ADMISSION
Sob and new atrial fibrillation

## 2020-03-31 NOTE — PROGRESS NOTE ADULT - SUBJECTIVE AND OBJECTIVE BOX
CC.  SOB/Atrial fib  HPI.  Patient reports SOB worsen.  LE edema improving.  afebrile.  Offers no other complaints        Constitutional: No fever, fatigue or weight loss.  Skin: No rash.  Eyes: No recent vision problems or eye pain.  ENT: No congestion, ear pain, or sore throat.  Endocrine: No thyroid problems.  Cardiovascular: No chest pain or palpation.  Respiratory: No  congestion, or wheezing.  Gastrointestinal: No abdominal pain, nausea, vomiting, or diarrhea.  Genitourinary: No dysuria.  Musculoskeletal: No joint swelling.  Neurologic: No headache.      Vital Signs Last 24 Hrs  T(C): 35.9 (31 Mar 2020 15:29), Max: 35.9 (31 Mar 2020 15:29)  T(F): 96.6 (31 Mar 2020 15:29), Max: 96.6 (31 Mar 2020 15:29)  HR: 86 (31 Mar 2020 15:29) (78 - 102)  BP: 93/47 (31 Mar 2020 15:29) (  111/51)  BP(mean): --  RR: 18 (31 Mar 2020 15:29) (18 - 18)  SpO2: 98% (31 Mar 2020 15:18) (92% - 99%)        PHYSICAL EXAM-  GENERAL: NAD,    HEAD:  Atraumatic, Normocephalic  EYES: EOMI, PERRLA, conjunctiva and sclera clear  NECK: Supple, No JVD, Normal thyroid  NERVOUS SYSTEM:  Alert & Oriented X3, Moving all extremities  CHEST/LUNG: Mild crackles with good air entry  HEART: Regular rate and rhythm; No murmurs, rubs, or gallops  ABDOMEN: Soft, Nontender, Nondistended; Bowel sounds present  EXTREMITIES:    No clubbing, cyanosis, or edema  SKIN: No rashes or lesions                              11.5   9.07  )-----------( 336      ( 31 Mar 2020 08:24 )             37.3     03-31    138  |  99  |  96<HH>  ----------------------------<  180<H>  5.4<H>   |  15<L>  |  4.9<HH>    Ca    8.1<L>      31 Mar 2020 08:24  Phos  7.6     03-30  Mg     2.3     03-30    TPro  5.8<L>  /  Alb  2.4<L>  /  TBili  x   /  DBili  x   /  AST  x   /  ALT  x   /  AlkPhos  x   03-30            PT/INR - ( 31 Mar 2020 12:37 )   PT: 14.60 sec;   INR: 1.27 ratio         PTT - ( 31 Mar 2020 12:37 )  PTT:35.1 sec              MEDICATIONS  (STANDING):  calcium acetate 1334 milliGRAM(s) Oral three times a day with meals  chlorhexidine 4% Liquid 1 Application(s) Topical <User Schedule>  dextrose 5%. 1000 milliLiter(s) (50 mL/Hr) IV Continuous <Continuous>  dextrose 50% Injectable 12.5 Gram(s) IV Push once  dextrose 50% Injectable 25 Gram(s) IV Push once  dextrose 50% Injectable 25 Gram(s) IV Push once  furosemide   Injectable 40 milliGRAM(s) IV Push two times a day  heparin  Injectable 5000 Unit(s) SubCutaneous every 12 hours  insulin lispro (HumaLOG) corrective regimen sliding scale   SubCutaneous three times a day before meals  metoprolol tartrate 12.5 milliGRAM(s) Oral two times a day  midodrine. 5 milliGRAM(s) Oral three times a day  pantoprazole    Tablet 40 milliGRAM(s) Oral before breakfast  simvastatin 20 milliGRAM(s) Oral at bedtime  sodium bicarbonate 650 milliGRAM(s) Oral three times a day    MEDICATIONS  (PRN):  dextrose 40% Gel 15 Gram(s) Oral once PRN Blood Glucose LESS THAN 70 milliGRAM(s)/deciliter  glucagon  Injectable 1 milliGRAM(s) IntraMuscular once PRN Glucose LESS THAN 70 milligrams/deciliter           [x ] YES  [ ] NO    Consultant(s) Notes Reviewed:  [x ] YES  [ ] NO    Care Discussed with Consultants/Other Providers [x ] YES  [ ] No medical contraindication for discharge

## 2020-03-31 NOTE — CONSULT NOTE ADULT - REASON FOR ADMISSION
Sob and new atrial fibrillation

## 2020-03-31 NOTE — CONSULT NOTE ADULT - ATTENDING COMMENTS
Attending Statement: I have personally performed a face to face diagnostic evaluation on this patient and have arrived at the suggestions for care. I have written all aspects of the above note.
68 yo male with shortness of breath,  complains of 3 days of dysphagia, odynophagia, but is able to eat.  Please see above for recommdations.

## 2020-03-31 NOTE — CHART NOTE - NSCHARTNOTEFT_GEN_A_CORE
Asked by RN to review ABG ordered in am but just completed. Oxygen level is good but PH is 7.2. RN tells me patient can't take oral meds, Will give dose of bicarb in smallest amount of fluid possible (I called pharmacy for assistance) Asked by RN to review ABG ordered in am but just completed. Oxygen level is good but PH is 7.2. Will give a dose of bicarb

## 2020-03-31 NOTE — PROGRESS NOTE ADULT - SUBJECTIVE AND OBJECTIVE BOX
Nephrology progress note    Patient is seen and examined, events over the last 24 h noted .  C/o SOB, oliguric on IV LAsix. BEing ruled out for oCOVID  Allergies:  No Known Allergies    Hospital Medications:   MEDICATIONS  (STANDING):  calcium acetate 1334 milliGRAM(s) Oral three times a day with meals  chlorhexidine 4% Liquid 1 Application(s) Topical <User Schedule>  dextrose 5%. 1000 milliLiter(s) (50 mL/Hr) IV Continuous <Continuous>  dextrose 50% Injectable 12.5 Gram(s) IV Push once  dextrose 50% Injectable 25 Gram(s) IV Push once  dextrose 50% Injectable 25 Gram(s) IV Push once  furosemide   Injectable 40 milliGRAM(s) IV Push two times a day  furosemide   Injectable 80 milliGRAM(s) IV Push once  insulin lispro (HumaLOG) corrective regimen sliding scale   SubCutaneous three times a day before meals  metoprolol tartrate 25 milliGRAM(s) Oral two times a day  pantoprazole    Tablet 40 milliGRAM(s) Oral before breakfast  simvastatin 20 milliGRAM(s) Oral at bedtime        VITALS:  T(F): 96 (03-31-20 @ 05:43), Max: 96 (03-30-20 @ 14:53)  HR: 81 (03-31-20 @ 06:47)  BP: 111/51 (03-31-20 @ 06:47)  RR: 18 (03-30-20 @ 14:53)  SpO2: 99% (03-31-20 @ 05:43)  Wt(kg): --    03-29 @ 07:01  -  03-30 @ 07:00  --------------------------------------------------------  IN: 0 mL / OUT: 400 mL / NET: -400 mL    03-30 @ 07:01  -  03-31 @ 07:00  --------------------------------------------------------  IN: 0 mL / OUT: 300 mL / NET: -300 mL          PHYSICAL EXAM:  Constitutional: Mild - moderate respiratory distress Sat 93% 2 L  Respiratory: deferred   Cardiovascular: S1, S2, RRR  Gastrointestinal: BS+, soft, NT/ND  Extremities: 1+ peripheral edema  Neurological: A/O x 3  : Has rayo.   Skin: No rashes  Vascular Access:    LABS:  03-30    138  |  99  |  85<HH>  ----------------------------<  190<H>  4.9   |  18  |  5.0<HH>    Ca    7.8<L>      30 Mar 2020 06:47  Phos  7.6     03-30  Mg     2.3     03-30                            11.0   9.23  )-----------( 325      ( 30 Mar 2020 06:47 )             35.1       Urine Studies:    Creatinine, Random Urine: 83 mg/dL (03-26 @ 14:18)  Sodium, Random Urine: 54.0 mmoL/L (03-26 @ 14:18)  Osmolality, Random Urine: 401 mos/kg (03-26 @ 14:18)    RADIOLOGY & ADDITIONAL STUDIES:

## 2020-03-31 NOTE — PROGRESS NOTE ADULT - ASSESSMENT
Patient is 70 yo male with history of DM 2, HLD, charcot's foot, home bound admitted for     1. Acute  hypoxic respiratory failure  Possible due to acute CHF with peserved EF  Repeat CXR shows increased bibasilar opacities/effusions.  Nephrology recommended IV Lasix with metazolone   Patient is afebrile, WBC WNL.  Discussed case with ID who recommended to test for COVID19.  Precaution ordered   TTE shows NL EF  monitor I/O  keep negative output  -Pulmonary to follow up        2.  atrial fib  Continue with BB for rate control  anticoagulation when stable  Cardiology to follow up     3. ADAL on CKD stage 4  -continue with rayo  -renal US shows no acute process  -Creatinine level elevated today  -IV lasix  -Monitor I/O  -Keep negative output  -BIcarb added  -further workup pending  - Nephrologist to follow up        4 DM2  - lantus and SSI  - Diabetic protocol    5. Hypercholesterolemia  - Statin    DVT prophylaxis heparin  Code full Code    #Progress Note Handoff  Pending (specify):  placement.  improvement of renal function/hypoxemia  Family discussion:  plan of care was discussed with patient  in details.  all questions were answered.  seems to understand, and in agreement.  Prognosis guarded  Disposition:  unknown Patient is 68 yo male with history of DM 2, HLD, charcot's foot, home bound admitted for     1. Acute  hypoxic respiratory failure  Possible due to acute CHF with peserved EF  Repeat CXR shows increased bibasilar opacities/effusions.  Nephrology recommended IV Lasix with metazolone   Patient is afebrile, WBC WNL.  Discussed case with ID who recommended to test for COVID19.  Precaution ordered   TTE shows NL EF  monitor I/O  keep negative output  -Pulmonary to follow up        2.  atrial fib  Continue with BB for rate control  anticoagulation when stable  Cardiology to follow up     3. ADAL on CKD stage 4  -continue with rayo  -renal US shows no acute process  -Creatinine level elevated today  -IV lasix  -Monitor I/O  -Keep negative output  -BIcarb added  -further workup pending  - Nephrologist to follow up        4 DM2  - lantus and SSI  - Diabetic protocol    5. Hypercholesterolemia  - Statin    DVT prophylaxis heparin  Code full Code    #Progress Note Handoff  Pending (specify):  placement.  improvement of renal function/hypoxemia  Family discussion:  plan of care was discussed with patient and wife  in details.  all questions were answered.  seems to understand, and in agreement.  Prognosis guarded  discussed code status with patient and wife, and they opted for full code.  prognosis guarded to poor Patient is 70 yo male with history of DM 2, HLD, charcot's foot, home bound admitted for     1. Acute  hypoxic respiratory failure  Possible due to acute CHF with peserved EF  Repeat CXR shows increased bibasilar opacities/effusions.  Nephrology recommended IV Lasix.  Hold metazolone for borderline BP.   Midodrine for now  Patient is afebrile, WBC WNL.  Discussed case with ID who recommended to test for COVID19.  Precaution ordered   TTE shows NL EF  monitor I/O  keep negative output  -Pulmonary to follow up        2.  atrial fib  Continue with BB for rate control  anticoagulation when stable  Cardiology to follow up     3. ADAL on CKD stage 4  -continue with rayo  -renal US shows no acute process  -Creatinine level elevated today  -IV lasix  -Monitor I/O  -Keep negative output  -BIcarb added  -further workup pending  - Nephrologist to follow up        4 DM2  - lantus and SSI  - Diabetic protocol    5. Hypercholesterolemia  - Statin    DVT prophylaxis heparin  Code full Code    #Progress Note Handoff  Pending (specify):  placement.  improvement of renal function/hypoxemia  Family discussion:  plan of care was discussed with patient and wife  in details.  all questions were answered.  seems to understand, and in agreement.  Prognosis guarded  discussed code status with patient and wife, and they opted for full code.  prognosis guarded to poor Global muscle tone and symmetry normal; joint contractures absent; periods of alertness noted; grossly responds to touch, light and sound stimuli; gag reflex present; normal suck-swallow patterns for age; cry with normal variation of amplitude and frequency; tongue motility size, and shape normal without atrophy or fasciculations;  deep tendon knee reflexes normal pattern for age; micky, and grasp reflexes acceptable.

## 2020-04-01 NOTE — PROVIDER CONTACT NOTE (OTHER) - SITUATION
Pt was found unresponsive, respiration-less and pulseless. Pupils fixed. Skin cool to touch. CPR and code blue initiated.

## 2020-04-15 DIAGNOSIS — Z79.82 LONG TERM (CURRENT) USE OF ASPIRIN: ICD-10-CM

## 2020-04-15 DIAGNOSIS — I87.8 OTHER SPECIFIED DISORDERS OF VEINS: ICD-10-CM

## 2020-04-15 DIAGNOSIS — I13.0 HYPERTENSIVE HEART AND CHRONIC KIDNEY DISEASE WITH HEART FAILURE AND STAGE 1 THROUGH STAGE 4 CHRONIC KIDNEY DISEASE, OR UNSPECIFIED CHRONIC KIDNEY DISEASE: ICD-10-CM

## 2020-04-15 DIAGNOSIS — Z20.828 CONTACT WITH AND (SUSPECTED) EXPOSURE TO OTHER VIRAL COMMUNICABLE DISEASES: ICD-10-CM

## 2020-04-15 DIAGNOSIS — E11.610 TYPE 2 DIABETES MELLITUS WITH DIABETIC NEUROPATHIC ARTHROPATHY: ICD-10-CM

## 2020-04-15 DIAGNOSIS — E78.00 PURE HYPERCHOLESTEROLEMIA, UNSPECIFIED: ICD-10-CM

## 2020-04-15 DIAGNOSIS — N17.9 ACUTE KIDNEY FAILURE, UNSPECIFIED: ICD-10-CM

## 2020-04-15 DIAGNOSIS — E11.22 TYPE 2 DIABETES MELLITUS WITH DIABETIC CHRONIC KIDNEY DISEASE: ICD-10-CM

## 2020-04-15 DIAGNOSIS — T50.2X5A ADVERSE EFFECT OF CARBONIC-ANHYDRASE INHIBITORS, BENZOTHIADIAZIDES AND OTHER DIURETICS, INITIAL ENCOUNTER: ICD-10-CM

## 2020-04-15 DIAGNOSIS — Z87.891 PERSONAL HISTORY OF NICOTINE DEPENDENCE: ICD-10-CM

## 2020-04-15 DIAGNOSIS — J96.01 ACUTE RESPIRATORY FAILURE WITH HYPOXIA: ICD-10-CM

## 2020-04-15 DIAGNOSIS — N18.4 CHRONIC KIDNEY DISEASE, STAGE 4 (SEVERE): ICD-10-CM

## 2020-04-15 DIAGNOSIS — I46.9 CARDIAC ARREST, CAUSE UNSPECIFIED: ICD-10-CM

## 2020-04-15 DIAGNOSIS — Z79.84 LONG TERM (CURRENT) USE OF ORAL HYPOGLYCEMIC DRUGS: ICD-10-CM

## 2020-04-15 DIAGNOSIS — I48.91 UNSPECIFIED ATRIAL FIBRILLATION: ICD-10-CM

## 2020-04-15 DIAGNOSIS — I50.31 ACUTE DIASTOLIC (CONGESTIVE) HEART FAILURE: ICD-10-CM

## 2020-04-15 DIAGNOSIS — Z79.899 OTHER LONG TERM (CURRENT) DRUG THERAPY: ICD-10-CM
